# Patient Record
Sex: FEMALE | Race: WHITE
[De-identification: names, ages, dates, MRNs, and addresses within clinical notes are randomized per-mention and may not be internally consistent; named-entity substitution may affect disease eponyms.]

---

## 2017-07-10 ENCOUNTER — APPOINTMENT (OUTPATIENT)
Dept: ORTHOPEDIC SURGERY | Facility: CLINIC | Age: 42
End: 2017-07-10

## 2017-07-10 VITALS
SYSTOLIC BLOOD PRESSURE: 98 MMHG | BODY MASS INDEX: 20.66 KG/M2 | HEIGHT: 64 IN | HEART RATE: 60 BPM | DIASTOLIC BLOOD PRESSURE: 67 MMHG | WEIGHT: 121 LBS

## 2017-07-10 DIAGNOSIS — Z87.39 PERSONAL HISTORY OF OTHER DISEASES OF THE MUSCULOSKELETAL SYSTEM AND CONNECTIVE TISSUE: ICD-10-CM

## 2017-07-10 DIAGNOSIS — M54.2 CERVICALGIA: ICD-10-CM

## 2018-02-20 ENCOUNTER — RESULT REVIEW (OUTPATIENT)
Age: 43
End: 2018-02-20

## 2018-03-05 ENCOUNTER — APPOINTMENT (OUTPATIENT)
Dept: MRI IMAGING | Facility: CLINIC | Age: 43
End: 2018-03-05

## 2018-03-05 ENCOUNTER — OUTPATIENT (OUTPATIENT)
Dept: OUTPATIENT SERVICES | Facility: HOSPITAL | Age: 43
LOS: 1 days | End: 2018-03-05
Payer: COMMERCIAL

## 2018-03-05 DIAGNOSIS — Z00.8 ENCOUNTER FOR OTHER GENERAL EXAMINATION: ICD-10-CM

## 2018-03-05 DIAGNOSIS — Z90.710 ACQUIRED ABSENCE OF BOTH CERVIX AND UTERUS: Chronic | ICD-10-CM

## 2018-03-05 DIAGNOSIS — Z96.60 PRESENCE OF UNSPECIFIED ORTHOPEDIC JOINT IMPLANT: Chronic | ICD-10-CM

## 2018-03-05 DIAGNOSIS — Z98.89 OTHER SPECIFIED POSTPROCEDURAL STATES: Chronic | ICD-10-CM

## 2018-03-05 PROCEDURE — 73721 MRI JNT OF LWR EXTRE W/O DYE: CPT

## 2018-03-05 PROCEDURE — 73721 MRI JNT OF LWR EXTRE W/O DYE: CPT | Mod: 26,LT

## 2018-08-20 ENCOUNTER — INPATIENT (INPATIENT)
Facility: HOSPITAL | Age: 43
LOS: 2 days | Discharge: HOME | DRG: 871 | End: 2018-08-23
Attending: HOSPITALIST | Admitting: HOSPITALIST
Payer: COMMERCIAL

## 2018-08-20 VITALS
OXYGEN SATURATION: 98 % | HEIGHT: 64 IN | SYSTOLIC BLOOD PRESSURE: 116 MMHG | WEIGHT: 113.98 LBS | DIASTOLIC BLOOD PRESSURE: 81 MMHG | TEMPERATURE: 98 F | HEART RATE: 83 BPM | RESPIRATION RATE: 20 BRPM

## 2018-08-20 DIAGNOSIS — Z96.60 PRESENCE OF UNSPECIFIED ORTHOPEDIC JOINT IMPLANT: Chronic | ICD-10-CM

## 2018-08-20 DIAGNOSIS — Z90.710 ACQUIRED ABSENCE OF BOTH CERVIX AND UTERUS: Chronic | ICD-10-CM

## 2018-08-20 DIAGNOSIS — Z98.89 OTHER SPECIFIED POSTPROCEDURAL STATES: Chronic | ICD-10-CM

## 2018-08-20 LAB
ALBUMIN SERPL ELPH-MCNC: 4.2 G/DL — SIGNIFICANT CHANGE UP (ref 3.3–5)
ALP SERPL-CCNC: 99 U/L — SIGNIFICANT CHANGE UP (ref 40–120)
ALT FLD-CCNC: 17 U/L — SIGNIFICANT CHANGE UP (ref 10–45)
ANION GAP SERPL CALC-SCNC: 11 MMOL/L — SIGNIFICANT CHANGE UP (ref 5–17)
AST SERPL-CCNC: 21 U/L — SIGNIFICANT CHANGE UP (ref 10–40)
BASOPHILS # BLD AUTO: 0.1 K/UL — SIGNIFICANT CHANGE UP (ref 0–0.2)
BASOPHILS NFR BLD AUTO: 0.6 % — SIGNIFICANT CHANGE UP (ref 0–2)
BILIRUB SERPL-MCNC: 0.3 MG/DL — SIGNIFICANT CHANGE UP (ref 0.2–1.2)
BUN SERPL-MCNC: 8 MG/DL — SIGNIFICANT CHANGE UP (ref 7–23)
CALCIUM SERPL-MCNC: 9.2 MG/DL — SIGNIFICANT CHANGE UP (ref 8.4–10.5)
CHLORIDE SERPL-SCNC: 104 MMOL/L — SIGNIFICANT CHANGE UP (ref 96–108)
CO2 SERPL-SCNC: 26 MMOL/L — SIGNIFICANT CHANGE UP (ref 22–31)
CREAT SERPL-MCNC: 0.69 MG/DL — SIGNIFICANT CHANGE UP (ref 0.5–1.3)
EOSINOPHIL # BLD AUTO: 0.1 K/UL — SIGNIFICANT CHANGE UP (ref 0–0.5)
EOSINOPHIL NFR BLD AUTO: 0.4 % — SIGNIFICANT CHANGE UP (ref 0–6)
GLUCOSE SERPL-MCNC: 93 MG/DL — SIGNIFICANT CHANGE UP (ref 70–99)
HCT VFR BLD CALC: 43.4 % — SIGNIFICANT CHANGE UP (ref 34.5–45)
HGB BLD-MCNC: 14.7 G/DL — SIGNIFICANT CHANGE UP (ref 11.5–15.5)
LYMPHOCYTES # BLD AUTO: 14.4 % — SIGNIFICANT CHANGE UP (ref 13–44)
LYMPHOCYTES # BLD AUTO: 2.2 K/UL — SIGNIFICANT CHANGE UP (ref 1–3.3)
MCHC RBC-ENTMCNC: 32.7 PG — SIGNIFICANT CHANGE UP (ref 27–34)
MCHC RBC-ENTMCNC: 33.9 GM/DL — SIGNIFICANT CHANGE UP (ref 32–36)
MCV RBC AUTO: 96.5 FL — SIGNIFICANT CHANGE UP (ref 80–100)
MONOCYTES # BLD AUTO: 0.9 K/UL — SIGNIFICANT CHANGE UP (ref 0–0.9)
MONOCYTES NFR BLD AUTO: 6.3 % — SIGNIFICANT CHANGE UP (ref 2–14)
NEUTROPHILS # BLD AUTO: 11.6 K/UL — HIGH (ref 1.8–7.4)
NEUTROPHILS NFR BLD AUTO: 78.3 % — HIGH (ref 43–77)
PLATELET # BLD AUTO: 266 K/UL — SIGNIFICANT CHANGE UP (ref 150–400)
POTASSIUM SERPL-MCNC: 4.5 MMOL/L — SIGNIFICANT CHANGE UP (ref 3.5–5.3)
POTASSIUM SERPL-SCNC: 4.5 MMOL/L — SIGNIFICANT CHANGE UP (ref 3.5–5.3)
PROT SERPL-MCNC: 7.2 G/DL — SIGNIFICANT CHANGE UP (ref 6–8.3)
RAPID RVP RESULT: SIGNIFICANT CHANGE UP
RBC # BLD: 4.5 M/UL — SIGNIFICANT CHANGE UP (ref 3.8–5.2)
RBC # FLD: 11.5 % — SIGNIFICANT CHANGE UP (ref 10.3–14.5)
SODIUM SERPL-SCNC: 141 MMOL/L — SIGNIFICANT CHANGE UP (ref 135–145)
WBC # BLD: 14.9 K/UL — HIGH (ref 3.8–10.5)
WBC # FLD AUTO: 14.9 K/UL — HIGH (ref 3.8–10.5)

## 2018-08-20 PROCEDURE — 71046 X-RAY EXAM CHEST 2 VIEWS: CPT | Mod: 26

## 2018-08-20 PROCEDURE — 99285 EMERGENCY DEPT VISIT HI MDM: CPT

## 2018-08-20 RX ORDER — SODIUM CHLORIDE 9 MG/ML
1000 INJECTION INTRAMUSCULAR; INTRAVENOUS; SUBCUTANEOUS ONCE
Qty: 0 | Refills: 0 | Status: COMPLETED | OUTPATIENT
Start: 2018-08-20 | End: 2018-08-20

## 2018-08-20 RX ORDER — IPRATROPIUM/ALBUTEROL SULFATE 18-103MCG
3 AEROSOL WITH ADAPTER (GRAM) INHALATION ONCE
Qty: 0 | Refills: 0 | Status: COMPLETED | OUTPATIENT
Start: 2018-08-20 | End: 2018-08-20

## 2018-08-20 RX ORDER — IPRATROPIUM/ALBUTEROL SULFATE 18-103MCG
3 AEROSOL WITH ADAPTER (GRAM) INHALATION
Qty: 0 | Refills: 0 | Status: COMPLETED | OUTPATIENT
Start: 2018-08-20 | End: 2018-08-20

## 2018-08-20 RX ADMIN — Medication 3 MILLILITER(S): at 23:16

## 2018-08-20 RX ADMIN — Medication 125 MILLIGRAM(S): at 21:30

## 2018-08-20 RX ADMIN — Medication 3 MILLILITER(S): at 21:24

## 2018-08-20 RX ADMIN — Medication 3 MILLILITER(S): at 21:51

## 2018-08-20 RX ADMIN — Medication 3 MILLILITER(S): at 21:31

## 2018-08-20 RX ADMIN — SODIUM CHLORIDE 1000 MILLILITER(S): 9 INJECTION INTRAMUSCULAR; INTRAVENOUS; SUBCUTANEOUS at 23:16

## 2018-08-20 NOTE — ED PROVIDER NOTE - MEDICAL DECISION MAKING DETAILS
44 y/o F pt with no significant PMHx c/o fever, myalgia, SOB, productive cough. Plan: IV steroids, nebs, labs, RVP, reassess

## 2018-08-20 NOTE — ED ADULT NURSE REASSESSMENT NOTE - NS ED NURSE REASSESS COMMENT FT1
Patient still had diffuse wheezing and course lungs sounds bilaterally after 3 duonebs; states she is feeling a little bit better. Patient to have chest xray.

## 2018-08-20 NOTE — ED ADULT NURSE NOTE - OBJECTIVE STATEMENT
43 y.o F presents to the ED from home c/o bronchitis. Patient is awake, alert and speaking coherently. As per patient she was diagnosed by her PCP 2 weeks ago with bronchitis; states she was prescribed Levaquin and a medrol pack. Patient reports having gotten back from [place] about 5 days ago and noticed symptoms getting worse. Patient reports having a fever of 101.0 last night and states she had a productive cough with green sputum. Patient is a daily smoker. Patient presents A&Ox3, afebrile and ambulatory; endorses headache, 3/10 but took Tylenol at 1800 prior to ED arrival; denies chest pain and SOB, wheezesw diffusely on ascultation. 43 y.o F presents to the ED from home c/o bronchitis. Patient is awake, alert and speaking coherently. As per patient she was diagnosed by her PCP 2 weeks ago with bronchitis; states she was prescribed Levaquin and a medrol pack. Patient reports having gotten back from [place] about 5 days ago and noticed symptoms getting worse. Patient reports having a fever of 101.0 last night and states she had a productive cough with green sputum accompanied by some SOB. Patient is a daily smoker. Patient presents A&Ox3, afebrile and ambulatory; endorses headache, 3/10 but took Tylenol at 1800 prior to ED arrival; denies chest pain but slightly SOB, 20 RR and spo2 98% on RA- continues to have productive cough. Patient had pneumonia 4x last year.

## 2018-08-20 NOTE — ED PROVIDER NOTE - OBJECTIVE STATEMENT
44 y/o F pt with no significant PMHx c/o worsening congestion, fever, headache and general body aches. Was recently Dx'd with bronchitis by PMD 2 weeks ago and completed XR which was normal. Was Rx'd Levaquin and steroidsx1 week. States that sx improved but returned after a few days. Now notes worsening cough, congestion and fever yesterday (Tm 101F). Notes SOB and nausea. Pt says she feels like "she has the flu". Pt went to see PMD today who referred pt to the ED because pt has a hx of 4 pneumonias in the past. Took Motrin PTA. Denies abd pain, dysuria, diarrhea or any other complaints.  Internal Medicine: Dr. Toñito Maradiaga

## 2018-08-21 DIAGNOSIS — A41.9 SEPSIS, UNSPECIFIED ORGANISM: ICD-10-CM

## 2018-08-21 DIAGNOSIS — R51 HEADACHE: ICD-10-CM

## 2018-08-21 DIAGNOSIS — J18.1 LOBAR PNEUMONIA, UNSPECIFIED ORGANISM: ICD-10-CM

## 2018-08-21 DIAGNOSIS — N64.89 OTHER SPECIFIED DISORDERS OF BREAST: ICD-10-CM

## 2018-08-21 DIAGNOSIS — J18.9 PNEUMONIA, UNSPECIFIED ORGANISM: ICD-10-CM

## 2018-08-21 LAB
APTT BLD: 33 SEC — SIGNIFICANT CHANGE UP (ref 27.5–37.4)
GRAM STN FLD: SIGNIFICANT CHANGE UP
HCT VFR BLD CALC: 42.1 % — SIGNIFICANT CHANGE UP (ref 34.5–45)
HGB BLD-MCNC: 13.2 G/DL — SIGNIFICANT CHANGE UP (ref 11.5–15.5)
INR BLD: 1.09 RATIO — SIGNIFICANT CHANGE UP (ref 0.88–1.16)
MCHC RBC-ENTMCNC: 30.3 PG — SIGNIFICANT CHANGE UP (ref 27–34)
MCHC RBC-ENTMCNC: 31.3 GM/DL — LOW (ref 32–36)
MCV RBC AUTO: 96.7 FL — SIGNIFICANT CHANGE UP (ref 80–100)
PLATELET # BLD AUTO: 249 K/UL — SIGNIFICANT CHANGE UP (ref 150–400)
PROTHROM AB SERPL-ACNC: 11.9 SEC — SIGNIFICANT CHANGE UP (ref 9.8–12.7)
RBC # BLD: 4.35 M/UL — SIGNIFICANT CHANGE UP (ref 3.8–5.2)
RBC # FLD: 11.4 % — SIGNIFICANT CHANGE UP (ref 10.3–14.5)
SPECIMEN SOURCE: SIGNIFICANT CHANGE UP
WBC # BLD: 8.3 K/UL — SIGNIFICANT CHANGE UP (ref 3.8–10.5)
WBC # FLD AUTO: 8.3 K/UL — SIGNIFICANT CHANGE UP (ref 3.8–10.5)

## 2018-08-21 PROCEDURE — 12345: CPT | Mod: NC

## 2018-08-21 PROCEDURE — 70450 CT HEAD/BRAIN W/O DYE: CPT | Mod: 26

## 2018-08-21 PROCEDURE — 99223 1ST HOSP IP/OBS HIGH 75: CPT

## 2018-08-21 PROCEDURE — 71275 CT ANGIOGRAPHY CHEST: CPT | Mod: 26

## 2018-08-21 RX ORDER — CEFTRIAXONE 500 MG/1
1 INJECTION, POWDER, FOR SOLUTION INTRAMUSCULAR; INTRAVENOUS EVERY 24 HOURS
Qty: 0 | Refills: 0 | Status: DISCONTINUED | OUTPATIENT
Start: 2018-08-21 | End: 2018-08-23

## 2018-08-21 RX ORDER — SENNA PLUS 8.6 MG/1
2 TABLET ORAL AT BEDTIME
Qty: 0 | Refills: 0 | Status: DISCONTINUED | OUTPATIENT
Start: 2018-08-21 | End: 2018-08-23

## 2018-08-21 RX ORDER — CEFTRIAXONE 500 MG/1
1 INJECTION, POWDER, FOR SOLUTION INTRAMUSCULAR; INTRAVENOUS ONCE
Qty: 0 | Refills: 0 | Status: COMPLETED | OUTPATIENT
Start: 2018-08-21 | End: 2018-08-21

## 2018-08-21 RX ORDER — DOCUSATE SODIUM 100 MG
100 CAPSULE ORAL THREE TIMES A DAY
Qty: 0 | Refills: 0 | Status: DISCONTINUED | OUTPATIENT
Start: 2018-08-21 | End: 2018-08-23

## 2018-08-21 RX ORDER — AZITHROMYCIN 500 MG/1
500 TABLET, FILM COATED ORAL ONCE
Qty: 0 | Refills: 0 | Status: COMPLETED | OUTPATIENT
Start: 2018-08-21 | End: 2018-08-21

## 2018-08-21 RX ORDER — LANOLIN ALCOHOL/MO/W.PET/CERES
3 CREAM (GRAM) TOPICAL AT BEDTIME
Qty: 0 | Refills: 0 | Status: COMPLETED | OUTPATIENT
Start: 2018-08-21 | End: 2018-08-22

## 2018-08-21 RX ORDER — AZITHROMYCIN 500 MG/1
500 TABLET, FILM COATED ORAL EVERY 24 HOURS
Qty: 0 | Refills: 0 | Status: DISCONTINUED | OUTPATIENT
Start: 2018-08-21 | End: 2018-08-23

## 2018-08-21 RX ORDER — SODIUM CHLORIDE 9 MG/ML
1000 INJECTION, SOLUTION INTRAVENOUS
Qty: 0 | Refills: 0 | Status: DISCONTINUED | OUTPATIENT
Start: 2018-08-21 | End: 2018-08-22

## 2018-08-21 RX ORDER — ACETAMINOPHEN 500 MG
650 TABLET ORAL EVERY 6 HOURS
Qty: 0 | Refills: 0 | Status: DISCONTINUED | OUTPATIENT
Start: 2018-08-21 | End: 2018-08-23

## 2018-08-21 RX ORDER — IPRATROPIUM/ALBUTEROL SULFATE 18-103MCG
3 AEROSOL WITH ADAPTER (GRAM) INHALATION EVERY 6 HOURS
Qty: 0 | Refills: 0 | Status: DISCONTINUED | OUTPATIENT
Start: 2018-08-21 | End: 2018-08-23

## 2018-08-21 RX ADMIN — Medication 375 MILLIGRAM(S): at 11:21

## 2018-08-21 RX ADMIN — Medication 100 MILLIGRAM(S): at 11:22

## 2018-08-21 RX ADMIN — Medication 3 MILLILITER(S): at 17:56

## 2018-08-21 RX ADMIN — CEFTRIAXONE 100 GRAM(S): 500 INJECTION, POWDER, FOR SOLUTION INTRAMUSCULAR; INTRAVENOUS at 01:40

## 2018-08-21 RX ADMIN — Medication 375 MILLIGRAM(S): at 12:53

## 2018-08-21 RX ADMIN — SODIUM CHLORIDE 150 MILLILITER(S): 9 INJECTION, SOLUTION INTRAVENOUS at 07:40

## 2018-08-21 RX ADMIN — Medication 3 MILLILITER(S): at 23:37

## 2018-08-21 RX ADMIN — AZITHROMYCIN 250 MILLIGRAM(S): 500 TABLET, FILM COATED ORAL at 02:33

## 2018-08-21 RX ADMIN — Medication 3 MILLILITER(S): at 11:21

## 2018-08-21 NOTE — H&P ADULT - HISTORY OF PRESENT ILLNESS
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    Pt states she was initially treated with azithromycin about 2 weeks ago for a few days, but then switched to levaquin and medrol dose pack, of which she took 1 week of, last dose was 8/11. Pt reports feeling better for a few days, but starting around 8/16, pt reports increasing fevers, cough, congestion, SOB, nausea. Her symptoms continued to get worse. In addition, she also started to have some headache and neck pain. Pt saw her PMD who referred her to the hospital. Pt is not sure whether her neck pain/stiffness is related to her cervical fusion. Pt also reports mild photophobia as well. Pt states her baseline SBP is in the 80s and 90s. Denies any rashes.    VS: Tm 98.7, P 58, BP 96/57, R 19, 96% RA  in the ED, received azithro, ceftriaxone, NS1L, duoneb x2, solumedrol 125.

## 2018-08-21 NOTE — H&P ADULT - ASSESSMENT
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

## 2018-08-21 NOTE — PROGRESS NOTE ADULT - ASSESSMENT
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 Headache mostly resolved, CTH reassuring as well.

## 2018-08-21 NOTE — PROGRESS NOTE ADULT - SUBJECTIVE AND OBJECTIVE BOX
CC: SOB    Overnight w/ out aucte concerns.  This AM still SOB w/ cough.    REVIEW OF SYSTEMS:  CONSTITUTIONAL: +Fevers, fatigue.  EYES: No eye pain, visual disturbances, or discharge  ENMT:  No difficulty hearing, tinnitus, vertigo; No sinus or throat pain  NECK: No pain or stiffness  RESPIRATORY: +cough, sOB, wheezing.  CARDIOVASCULAR: No chest pain, palpitations, dizziness, or leg swelling  GASTROINTESTINAL: No abdominal or epigastric pain. No nausea, vomiting, or hematemesis; No diarrhea or constipation. No melena or hematochezia.  GENITOURINARY: No dysuria, frequency, hematuria, or incontinence  NEUROLOGICAL: No headaches, memory loss, loss of strength, numbness, or tremors  SKIN: No itching, burning, rashes, or lesions   ENDOCRINE: No heat or cold intolerance; No hair loss  MUSCULOSKELETAL: No joint pain or swelling; No muscle, back, or extremity pain  PSYCHIATRIC: No depression, anxiety, mood swings, or difficulty sleeping  HEME/LYMPH: No easy bruising, or bleeding gums    T(C): 36.8 (08-21-18 @ 12:26), Max: 37.1 (08-21-18 @ 01:37)  HR: 62 (08-21-18 @ 12:26) (56 - 83)  BP: 108/73 (08-21-18 @ 12:26) (96/57 - 116/81)  RR: 18 (08-21-18 @ 12:26) (16 - 20)  SpO2: 98% (08-21-18 @ 12:26) (96% - 100%)  Wt(kg): --Vital Signs Last 24 Hrs  T(C): 36.8 (21 Aug 2018 12:26), Max: 37.1 (21 Aug 2018 01:37)  T(F): 98.2 (21 Aug 2018 12:26), Max: 98.7 (21 Aug 2018 01:37)  HR: 62 (21 Aug 2018 12:26) (56 - 83)  BP: 108/73 (21 Aug 2018 12:26) (96/57 - 116/81)  BP(mean): --  RR: 18 (21 Aug 2018 12:26) (16 - 20)  SpO2: 98% (21 Aug 2018 12:26) (96% - 100%)    PHYSICAL EXAM:  GENERAL: NAD, well-groomed. In good spirits, mother by bedside.   HEAD:  Atraumatic, Normocephalic  EYES: EOMI, conjunctiva and sclera clear  NECK: Supple, No JVD  NERVOUS SYSTEM:  Alert & Oriented X3, Good concentration; Motor Strength 5/5 B/L upper and lower extremities  CHEST/LUNG: b/l crackles, wheezing.  HEART: Regular rate and rhythm; No murmurs  ABDOMEN: Soft, Nontender, Nondistended; Bowel sounds present  EXTREMITIES:  2+ Peripheral Pulses, No clubbing, cyanosis, or edema  SKIN: No rashes or lesions    Consultant(s) Notes Reviewed:  [x ] YES  [ ] NO    LABS:  leukocytosis resolved, BMP WNL.                         13.2   8.3   )-----------( 249      ( 21 Aug 2018 07:51 )             42.1     08-20    141  |  104  |  8   ----------------------------<  93  4.5   |  26  |  0.69    Ca    9.2      20 Aug 2018 21:26    TPro  7.2  /  Alb  4.2  /  TBili  0.3  /  DBili  x   /  AST  21  /  ALT  17  /  AlkPhos  99  08-20    PT/INR - ( 21 Aug 2018 07:51 )   PT: 11.9 sec;   INR: 1.09 ratio      PTT - ( 21 Aug 2018 07:51 )  PTT:33.0 sec    CAPILLARY BLOOD GLUCOSE    RADIOLOGY & ADDITIONAL TESTS:  Imaging Personally Reviewed:   Care discussed w/ other providers: yes--  Consultant notes reviewed: yes    CTH WNL  CT chest w/ ?RLL PNA. CC: SOB    Overnight w/ out aucte concerns.  This AM still SOB w/ cough.    REVIEW OF SYSTEMS:  CONSTITUTIONAL: +Fevers, fatigue.  EYES: No eye pain, visual disturbances, or discharge  ENMT:  No difficulty hearing, tinnitus, vertigo; No sinus or throat pain  NECK: No pain or stiffness  RESPIRATORY: +cough, sOB, wheezing.  CARDIOVASCULAR: No chest pain, palpitations, dizziness, or leg swelling  GASTROINTESTINAL: No abdominal or epigastric pain. No nausea, vomiting, or hematemesis; No diarrhea or constipation. No melena or hematochezia.  GENITOURINARY: No dysuria, frequency, hematuria, or incontinence  NEUROLOGICAL: No headaches, memory loss, loss of strength, numbness, or tremors  SKIN: No itching, burning, rashes, or lesions   ENDOCRINE: No heat or cold intolerance; No hair loss  MUSCULOSKELETAL: No joint pain or swelling; No muscle, back, or extremity pain  PSYCHIATRIC: No depression, anxiety, mood swings, or difficulty sleeping  HEME/LYMPH: No easy bruising, or bleeding gums    T(C): 36.8 (08-21-18 @ 12:26), Max: 37.1 (08-21-18 @ 01:37)  HR: 62 (08-21-18 @ 12:26) (56 - 83)  BP: 108/73 (08-21-18 @ 12:26) (96/57 - 116/81)  RR: 18 (08-21-18 @ 12:26) (16 - 20)  SpO2: 98% (08-21-18 @ 12:26) (96% - 100%)  Wt(kg): --Vital Signs Last 24 Hrs  T(C): 36.8 (21 Aug 2018 12:26), Max: 37.1 (21 Aug 2018 01:37)  T(F): 98.2 (21 Aug 2018 12:26), Max: 98.7 (21 Aug 2018 01:37)  HR: 62 (21 Aug 2018 12:26) (56 - 83)  BP: 108/73 (21 Aug 2018 12:26) (96/57 - 116/81)  BP(mean): --  RR: 18 (21 Aug 2018 12:26) (16 - 20)  SpO2: 98% (21 Aug 2018 12:26) (96% - 100%)    PHYSICAL EXAM:  GENERAL: NAD, well-groomed. In good spirits, mother by bedside.   HEAD:  Atraumatic, Normocephalic  EYES: EOMI, conjunctiva and sclera clear  NECK: Supple, No JVD. Easily moving L and R, up and down.  NERVOUS SYSTEM:  Alert & Oriented X3, Good concentration; Motor Strength 5/5 B/L upper and lower extremities  CHEST/LUNG: b/l crackles, wheezing.  HEART: Regular rate and rhythm; No murmurs  ABDOMEN: Soft, Nontender, Nondistended; Bowel sounds present  EXTREMITIES:  2+ Peripheral Pulses, No clubbing, cyanosis, or edema  SKIN: No rashes or lesions    Consultant(s) Notes Reviewed:  [x ] YES  [ ] NO    LABS:  leukocytosis resolved, BMP WNL.                         13.2   8.3   )-----------( 249      ( 21 Aug 2018 07:51 )             42.1     08-20    141  |  104  |  8   ----------------------------<  93  4.5   |  26  |  0.69    Ca    9.2      20 Aug 2018 21:26    TPro  7.2  /  Alb  4.2  /  TBili  0.3  /  DBili  x   /  AST  21  /  ALT  17  /  AlkPhos  99  08-20    PT/INR - ( 21 Aug 2018 07:51 )   PT: 11.9 sec;   INR: 1.09 ratio      PTT - ( 21 Aug 2018 07:51 )  PTT:33.0 sec    CAPILLARY BLOOD GLUCOSE    RADIOLOGY & ADDITIONAL TESTS:  Imaging Personally Reviewed:   Care discussed w/ other providers: yes--  Consultant notes reviewed: yes    CTH WNL  CT chest w/ ?RLL PNA.

## 2018-08-21 NOTE — H&P ADULT - PROBLEM SELECTOR PLAN 1
- s/p CTA chest  - cont azithro/ceftriaxone for now  - cont prednisone 40qd for taper  - cont duonebs

## 2018-08-21 NOTE — PROGRESS NOTE ADULT - PROBLEM SELECTOR PLAN 1
- s/p CTA chest  - cont azithro/ceftriaxone for now  - cont prednisone 40qd for taper  - cont duonebs - s/p CTA chest  - cont azithro/ceftriaxone for now  - f/u Geoff, sputum culture  - cont prednisone 40qd for taper  - cont duonebs - s/p CTA chest w/ suggestion of RLL PNA  - also clinically w/ COPD exacerbation  - cont azithro/ceftriaxone for now  - f/u Cassiuseghughlla, sputum culture  - cont pred 40, improving as of today though reports feeling "tight" still. If not continuing to improve would consider switching to short course of IV steroids.  - try to minimize nsaid while taking prednisone.  - cont duonebs - s/p CTA chest w/ suggestion of RLL PNA  - clinically w/ COPD exacerbation  - cont azithro/ceftriaxone for now  - f/u Cassiuseghughlla, sputum culture  - cont pred 40, improving as of today though reports feeling "tight" still. If not continuing to improve would consider switching to short course of IV steroids.  - try to minimize nsaid while taking prednisone.  - cont duonebs

## 2018-08-21 NOTE — H&P ADULT - PROBLEM SELECTOR PLAN 3
- pt currently appears nontoxic, but is complaining of initially severe headache that has now improved but still persistent. Also with associated neck pain in setting of spinal fusion and baseline limited ROM of neck. And also mild photophobia. Brudzinski sign negative.  - will try trial of tylenol and naprosyn  - if clinically on exam, by vitals, or by headache is worsening, would need IR guided LP.  - cont to monitor - pt currently appears nontoxic, but is complaining of initially severe headache that has now improved but still persistent. Also with associated neck pain in setting of spinal fusion and baseline limited ROM of neck. And also mild photophobia. Brudzinski sign negative.  - will try trial of tylenol and naprosyn.  - will get CTH  - if clinically on exam, by vitals, or by headache is worsening, would need IR guided LP.  - cont to monitor

## 2018-08-21 NOTE — H&P ADULT - NSHPREVIEWOFSYSTEMS_GEN_ALL_CORE
REVIEW OF SYSTEMS:  CONSTITUTIONAL: +weakness. +fevers. +chills. No rigors. No weight loss. No poor appetite.  EYES: +mild visual changes. No eye pain.  ENT: No hearing difficulty. No vertigo. No dysphagia. No Sinusitis/rhinorrhea.  NECK: +pain. +mild stiffness/rigidity.  CARDIAC: No chest pain. No palpitations.  RESPIRATORY: +cough. +SOB. No hemoptysis.  GASTROINTESTINAL: No abdominal pain. +nausea. No vomiting. No hematemesis. No diarrhea. No constipation. No melena. No hematochezia.  GENITOURINARY: No dysuria. No frequency. No hesitancy. No hematuria.  NEUROLOGICAL: No numbness/tingling. No focal weakness. No incontinence. No headache.  BACK: +chronic back pain.  EXTREMITIES: No lower extremity edema. Full ROM.  SKIN: No rashes. No itching. No other lesions.  PSYCHIATRIC: No depression. No anxiety. No SI/HI.  ALLERGIC: No lip swelling. No hives.  All other review of systems is negative unless indicated above.  Unless indicated above, unable to assess ROS 2/2

## 2018-08-21 NOTE — H&P ADULT - PROBLEM SELECTOR PLAN 2
- likely 2/2 CAP, less likely meningitis  - RVP negative  - send sputum culture  - send blood culture

## 2018-08-21 NOTE — H&P ADULT - NSHPLABSRESULTS_GEN_ALL_CORE
Personally reviewed labs.   Personally reviewed imaging.                           14.7   14.9  )-----------( 266      ( 20 Aug 2018 21:26 )             43.4       08-20    141  |  104  |  8   ----------------------------<  93  4.5   |  26  |  0.69    Ca    9.2      20 Aug 2018 21:26    TPro  7.2  /  Alb  4.2  /  TBili  0.3  /  DBili  x   /  AST  21  /  ALT  17  /  AlkPhos  99  08-20            LIVER FUNCTIONS - ( 20 Aug 2018 21:26 )  Alb: 4.2 g/dL / Pro: 7.2 g/dL / ALK PHOS: 99 U/L / ALT: 17 U/L / AST: 21 U/L / GGT: x

## 2018-08-21 NOTE — ED ADULT NURSE REASSESSMENT NOTE - NS ED NURSE REASSESS COMMENT FT1
Patient reassessed; no change in lung sounds, wheezes remain. BP 96/57 but patient reports her BP is frequently low. 20 g IV in R AC patent and site remains WNL. Patient admitted and waiting for bed assignment; resting comfortably.

## 2018-08-21 NOTE — H&P ADULT - PROBLEM SELECTOR PLAN 4
- Pt provided copy of imaging results regarding asymmetric left breast density.   - Pt reports last mammogram was a few months ago that showed right breast abnormality  - pt to followup with obgyn as outpt for further evaluation

## 2018-08-21 NOTE — PROGRESS NOTE ADULT - PROBLEM SELECTOR PLAN 2
- likely 2/2 CAP, less likely meningitis  - RVP negative  - send sputum culture  - send blood culture - resolved; no more leukocytosis, tachycardia  - f/u sputum, blood culture.   - ?RLL PNA, will cont w/ abx for now.

## 2018-08-21 NOTE — PROGRESS NOTE ADULT - PROBLEM SELECTOR PLAN 3
- pt currently appears nontoxic, but is complaining of initially severe headache that has now improved but still persistent. Also with associated neck pain in setting of spinal fusion and baseline limited ROM of neck. And also mild photophobia. Brudzinski sign negative.  - will try trial of tylenol and naprosyn.  - will get CTH  - if clinically on exam, by vitals, or by headache is worsening, would need IR guided LP.  - cont to monitor - resolved - resolved.  - neck fully mobile.  - likely from dehydration, cont ot monitor.  - try to avoid nsaids while on steroids if possible.

## 2018-08-21 NOTE — H&P ADULT - NSHPSOCIALHISTORY_GEN_ALL_CORE
Social History:    Marital Status: ( x ) , (  ) Single, (  ) , (  ) , (  )   # of Children: 3  Lives with: (  ) alone, ( x ) children, (  ) spouse, (  ) parents, (  ) siblings, (  ) friends, (  ) other:   Occupation:     Substance Use/Illicit Drugs: (  ) never used vs other:   Tobacco Usage: (  ) never smoked, ( x ) former smoker, (  ) current smoker and Total Pack-Years: 15  Last Alcohol Usage/Frequency/Amount/Withdrawal/Hx of Abuse:  last drink 4 days ago  Foreign travel/Animal exposure:

## 2018-08-21 NOTE — H&P ADULT - NSHPPHYSICALEXAM_GEN_ALL_CORE
PHYSICAL EXAM:   GENERAL: Alert. Not confused. No acute distress. +thin.   HEAD:  Atraumatic. Normocephalic.  EYES: EOMI. PERRLA. Normal conjunctiva/sclera.  ENT: Neck supple. No JVD. Moist oral mucosa. Not edentulous. No thrush.  LYMPH: Normal supraclavicular/cervical lymph nodes.   CARDIAC: Not tachy, Not eric. Regular rhythm. Not irregularly irregular. S1. S2. No murmur. No rub. No distant heart sounds.  LUNG/CHEST: +rhonchi b/l. +inspiratory and expiratory wheezes.  ABDOMEN: Soft. No tenderness. No distension. No fluid wave. Normal bowel sounds.  BACK: No midline/vertebral tenderness. No flank tenderness.  VASCULAR: +2 b/l radial or ulnar pulses. Palpable DP pulses.  EXTREMITIES:  No clubbing. No cyanosis. No edema. Moving all 4.  NEUROLOGY: A&Ox3. Non-focal exam. Cranial nerves intact. Slight lisp. Sensation intact.  PSYCH: Normal behavior. Normal affect.  SKIN: No jaundice. No erythema. No rash/lesion.  Vascular Access:     ICU Vital Signs Last 24 Hrs  T(C): 37 (21 Aug 2018 05:59), Max: 37.1 (21 Aug 2018 01:37)  T(F): 98.6 (21 Aug 2018 05:59), Max: 98.7 (21 Aug 2018 01:37)  HR: 58 (21 Aug 2018 05:59) (58 - 83)  BP: 96/57 (21 Aug 2018 05:59) (96/57 - 116/81)  BP(mean): --  ABP: --  ABP(mean): --  RR: 16 (21 Aug 2018 05:59) (16 - 20)  SpO2: 96% (21 Aug 2018 05:59) (96% - 100%)      I&O's Summary

## 2018-08-22 ENCOUNTER — TRANSCRIPTION ENCOUNTER (OUTPATIENT)
Age: 43
End: 2018-08-22

## 2018-08-22 DIAGNOSIS — Z29.9 ENCOUNTER FOR PROPHYLACTIC MEASURES, UNSPECIFIED: ICD-10-CM

## 2018-08-22 DIAGNOSIS — F17.210 NICOTINE DEPENDENCE, CIGARETTES, UNCOMPLICATED: ICD-10-CM

## 2018-08-22 LAB
ALBUMIN SERPL ELPH-MCNC: 3.2 G/DL — LOW (ref 3.3–5)
ALP SERPL-CCNC: 66 U/L — SIGNIFICANT CHANGE UP (ref 40–120)
ALT FLD-CCNC: 15 U/L — SIGNIFICANT CHANGE UP (ref 10–45)
ANION GAP SERPL CALC-SCNC: 9 MMOL/L — SIGNIFICANT CHANGE UP (ref 5–17)
AST SERPL-CCNC: 18 U/L — SIGNIFICANT CHANGE UP (ref 10–40)
BILIRUB SERPL-MCNC: 0.2 MG/DL — SIGNIFICANT CHANGE UP (ref 0.2–1.2)
BUN SERPL-MCNC: 9 MG/DL — SIGNIFICANT CHANGE UP (ref 7–23)
CALCIUM SERPL-MCNC: 8.4 MG/DL — SIGNIFICANT CHANGE UP (ref 8.4–10.5)
CHLORIDE SERPL-SCNC: 107 MMOL/L — SIGNIFICANT CHANGE UP (ref 96–108)
CO2 SERPL-SCNC: 27 MMOL/L — SIGNIFICANT CHANGE UP (ref 22–31)
CREAT SERPL-MCNC: 0.76 MG/DL — SIGNIFICANT CHANGE UP (ref 0.5–1.3)
GAS PNL BLDV: SIGNIFICANT CHANGE UP
GLUCOSE SERPL-MCNC: 88 MG/DL — SIGNIFICANT CHANGE UP (ref 70–99)
HCT VFR BLD CALC: 35.7 % — SIGNIFICANT CHANGE UP (ref 34.5–45)
HGB BLD-MCNC: 11.6 G/DL — SIGNIFICANT CHANGE UP (ref 11.5–15.5)
LACTATE SERPL-SCNC: 2.4 MMOL/L — HIGH (ref 0.7–2)
LACTATE SERPL-SCNC: 3.6 MMOL/L — HIGH (ref 0.7–2)
MCHC RBC-ENTMCNC: 32 PG — SIGNIFICANT CHANGE UP (ref 27–34)
MCHC RBC-ENTMCNC: 32.5 GM/DL — SIGNIFICANT CHANGE UP (ref 32–36)
MCV RBC AUTO: 98.6 FL — SIGNIFICANT CHANGE UP (ref 80–100)
PLATELET # BLD AUTO: 206 K/UL — SIGNIFICANT CHANGE UP (ref 150–400)
POTASSIUM SERPL-MCNC: 4.5 MMOL/L — SIGNIFICANT CHANGE UP (ref 3.5–5.3)
POTASSIUM SERPL-SCNC: 4.5 MMOL/L — SIGNIFICANT CHANGE UP (ref 3.5–5.3)
PROT SERPL-MCNC: 5.5 G/DL — LOW (ref 6–8.3)
RBC # BLD: 3.62 M/UL — LOW (ref 3.8–5.2)
RBC # FLD: 13.1 % — SIGNIFICANT CHANGE UP (ref 10.3–14.5)
SODIUM SERPL-SCNC: 143 MMOL/L — SIGNIFICANT CHANGE UP (ref 135–145)
WBC # BLD: 8.49 K/UL — SIGNIFICANT CHANGE UP (ref 3.8–10.5)
WBC # FLD AUTO: 8.49 K/UL — SIGNIFICANT CHANGE UP (ref 3.8–10.5)

## 2018-08-22 PROCEDURE — 99232 SBSQ HOSP IP/OBS MODERATE 35: CPT

## 2018-08-22 RX ORDER — SODIUM CHLORIDE 9 MG/ML
500 INJECTION INTRAMUSCULAR; INTRAVENOUS; SUBCUTANEOUS ONCE
Qty: 0 | Refills: 0 | Status: COMPLETED | OUTPATIENT
Start: 2018-08-22 | End: 2018-08-22

## 2018-08-22 RX ORDER — BUPROPION HYDROCHLORIDE 150 MG/1
150 TABLET, EXTENDED RELEASE ORAL
Qty: 0 | Refills: 0 | Status: CANCELLED | OUTPATIENT
Start: 2018-08-25 | End: 2018-08-23

## 2018-08-22 RX ORDER — SODIUM CHLORIDE 9 MG/ML
1000 INJECTION, SOLUTION INTRAVENOUS
Qty: 0 | Refills: 0 | Status: DISCONTINUED | OUTPATIENT
Start: 2018-08-22 | End: 2018-08-23

## 2018-08-22 RX ORDER — BUPROPION HYDROCHLORIDE 150 MG/1
150 TABLET, EXTENDED RELEASE ORAL DAILY
Qty: 0 | Refills: 0 | Status: DISCONTINUED | OUTPATIENT
Start: 2018-08-22 | End: 2018-08-23

## 2018-08-22 RX ADMIN — Medication 40 MILLIGRAM(S): at 05:18

## 2018-08-22 RX ADMIN — Medication 3 MILLILITER(S): at 05:18

## 2018-08-22 RX ADMIN — SODIUM CHLORIDE 1000 MILLILITER(S): 9 INJECTION INTRAMUSCULAR; INTRAVENOUS; SUBCUTANEOUS at 14:05

## 2018-08-22 RX ADMIN — Medication 3 MILLILITER(S): at 17:33

## 2018-08-22 RX ADMIN — Medication 600 MILLIGRAM(S): at 13:00

## 2018-08-22 RX ADMIN — BUPROPION HYDROCHLORIDE 150 MILLIGRAM(S): 150 TABLET, EXTENDED RELEASE ORAL at 13:00

## 2018-08-22 RX ADMIN — Medication 600 MILLIGRAM(S): at 21:34

## 2018-08-22 RX ADMIN — CEFTRIAXONE 100 GRAM(S): 500 INJECTION, POWDER, FOR SOLUTION INTRAMUSCULAR; INTRAVENOUS at 01:40

## 2018-08-22 RX ADMIN — Medication 3 MILLILITER(S): at 12:00

## 2018-08-22 RX ADMIN — SODIUM CHLORIDE 1000 MILLILITER(S): 9 INJECTION INTRAMUSCULAR; INTRAVENOUS; SUBCUTANEOUS at 17:33

## 2018-08-22 RX ADMIN — SODIUM CHLORIDE 100 MILLILITER(S): 9 INJECTION, SOLUTION INTRAVENOUS at 07:35

## 2018-08-22 RX ADMIN — Medication 3 MILLIGRAM(S): at 00:56

## 2018-08-22 RX ADMIN — AZITHROMYCIN 250 MILLIGRAM(S): 500 TABLET, FILM COATED ORAL at 02:25

## 2018-08-22 NOTE — PROGRESS NOTE ADULT - PROBLEM SELECTOR PLAN 3
- resolved.  - neck fully mobile.  - likely from dehydration, cont ot monitor.  - try to avoid nsaids while on steroids if possible. -Resolved.  -Possibly was from dehydration, cont to monitor. C/w IVF's.   -Tylenol PRN.

## 2018-08-22 NOTE — DISCHARGE NOTE ADULT - CARE PLAN
Principal Discharge DX:	Sepsis, due to unspecified organism  Goal:	Improved  Assessment and plan of treatment:	Take all antibiotics as ordered.  Call you Health care provider upon arrival home to make a one week follow up appointment.  If you develop fever, chills, malaise, or change in mental status call your Health Care Provider or go to the Emergency Department.  Nutrition is important, eat small frequent meals to help ensure you get adequate calories.  Do not stay in bed all day!  Increase your activity daily as tolerated.  Secondary Diagnosis:	Pneumonia of right lower lobe due to infectious organism  Assessment and plan of treatment:	Pneumonia is a lung infection that can cause a fever, cough, and trouble breathing.  Continue all antibiotics as ordered until complete.  Nutrition is important, eat small frequent meals.  Get lots of rest and drink fluids.  Call your health care provider upon arrival home from hospital and make a follow up appointment for one week.  If your cough worsens, you develop fever greater than 101', you have shaking chills, a fast heartbeat, trouble breathing and/or feel your are breathing much faster than usual, call your healthcare provider.  Make sure you wash your hands frequently.  Secondary Diagnosis:	Acute nonintractable headache, unspecified headache type  Goal:	resolved  Assessment and plan of treatment:	continue with pain regimen.  Secondary Diagnosis:	Taxa-Kdsas-Teyxpvo disease, bilateral  Secondary Diagnosis:	Tobacco dependence due to cigarettes Principal Discharge DX:	Sepsis, due to unspecified organism  Goal:	Improved  Assessment and plan of treatment:	Take all antibiotics as ordered.  Call you Health care provider upon arrival home to make a one week follow up appointment.  If you develop fever, chills, malaise, or change in mental status call your Health Care Provider or go to the Emergency Department.  Nutrition is important, eat small frequent meals to help ensure you get adequate calories.  Do not stay in bed all day!  Increase your activity daily as tolerated.  Secondary Diagnosis:	Pneumonia of right lower lobe due to infectious organism  Assessment and plan of treatment:	Pneumonia is a lung infection that can cause a fever, cough, and trouble breathing.  Continue all antibiotics as ordered until complete.  Nutrition is important, eat small frequent meals.  Get lots of rest and drink fluids.  Call your health care provider upon arrival home from hospital and make a follow up appointment for one week.  If your cough worsens, you develop fever greater than 101', you have shaking chills, a fast heartbeat, trouble breathing and/or feel your are breathing much faster than usual, call your healthcare provider.  Make sure you wash your hands frequently.  Secondary Diagnosis:	Acute nonintractable headache, unspecified headache type  Goal:	resolved  Assessment and plan of treatment:	continue with pain regimen.  Secondary Diagnosis:	Nxtn-Vmcsu-Ubswvls disease, bilateral  Secondary Diagnosis:	Tobacco dependence due to cigarettes  Assessment and plan of treatment:	Continue taking the Wellbutrin to help you quit smoking. Follow up with Dr. Maradiaga.

## 2018-08-22 NOTE — PROGRESS NOTE ADULT - PROBLEM SELECTOR PLAN 1
- s/p CTA chest w/ suggestion of RLL PNA  - clinically w/ COPD exacerbation  - cont azithro/ceftriaxone for now  - f/u Cassiuseghughlla, sputum culture  - cont pred 40, improving as of today though reports feeling "tight" still. If not continuing to improve would consider switching to short course of IV steroids.  - try to minimize nsaid while taking prednisone.  - cont duonebs -S/p CTA chest with RLL PNA.  -Clinically with ?COPD exacerbation, though no known diagnosis of COPD and no obvious signs of COPD on CT angio chest. Though smoking history present.   -C/w azithromycin/ceftriaxone for now. Tentative 5-7 day course of abx.   -Urine legionella testing, sputum culture testing.   -C/w prednisone 40mg PO daily x 5 days.  -C/w Duonebs Q6H standing.   -Will start guaifenesin ER 600mg twice daily for cough.  -Tylenol PRN.

## 2018-08-22 NOTE — DISCHARGE NOTE ADULT - NS AS DC FOLLOWUP STROKE INST
Influenza vaccination ([facility] Pub Date: August 7, 2015)/Smoking Cessation/Pneumonia Pneumonia/Smoking Cessation

## 2018-08-22 NOTE — DISCHARGE NOTE ADULT - HOSPITAL COURSE
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      Problem/Plan - 1:  ·  Problem: CAP (community acquired pneumonia).  Plan: - s/p CTA chest w/ suggestion of RLL PNA  - clinically w/ COPD exacerbation  - cont azithro/ceftriaxone for now  - f/u Ulegionella, sputum culture  - cont pred 40, improving as of today though reports feeling "tight" still. If not continuing to improve would consider switching to short course of IV steroids.  - try to minimize nsaid while taking prednisone.  - cont duonebs.     Problem/Plan - 2:  ·  Problem: Sepsis, due to unspecified organism.  Plan: - resolved; no more leukocytosis, tachycardia  - f/u sputum, blood culture.   - ?RLL PNA, will cont w/ abx for now.     Problem/Plan - 3:  ·  Problem: Acute nonintractable headache, unspecified headache type.  Plan: - resolved.  - neck fully mobile.  - likely from dehydration, cont ot monitor.  - try to avoid nsaids while on steroids if possible.     Problem/Plan - 4:  ·  Problem: Breast asymmetry.  Plan: - Pt provided copy of imaging results regarding asymmetric left breast density.   - Pt reports last mammogram was a few months ago that showed right breast abnormality  - pt to followup with obgyn as outpt for further evaluation.     Problem/Plan - 5:  ·  Problem: Tobacco dependence due to cigarettes.

## 2018-08-22 NOTE — DISCHARGE NOTE ADULT - PATIENT PORTAL LINK FT
You can access the [facility] Patient Portal, offered by [facility], by registering with the following website: [email or web address]

## 2018-08-22 NOTE — PROGRESS NOTE ADULT - PROBLEM SELECTOR PLAN 2
- resolved; no more leukocytosis, tachycardia  - f/u sputum, blood culture.   - ?RLL PNA, will cont w/ abx for now. -Resolved; no more leukocytosis or tachycardia. Mild hypotension reportedly chronic.   -F/u blood cultures; no growth to date.   -RLL PNA seen on CT chest, will c/w abx as above.  -Lactate noted to be elevated to 2.2 this morning, went up to 3.6 this afternoon.   -C/w IVF's. Will give NS bolus of 500cc. Repeat lactate in am.  -Monitor vitals closely.

## 2018-08-22 NOTE — PROGRESS NOTE ADULT - PROBLEM SELECTOR PLAN 4
- Pt provided copy of imaging results regarding asymmetric left breast density.   - Pt reports last mammogram was a few months ago that showed right breast abnormality  - pt to followup with obgyn as outpt for further evaluation -Patient provided copy of imaging results regarding asymmetric left breast density.   -Patient reports last mammogram was a few months ago that showed ?right breast abnormality.  -Patient to followup with gynecologist/PMD as outpatient for further evaluation.

## 2018-08-22 NOTE — PROGRESS NOTE ADULT - SUBJECTIVE AND OBJECTIVE BOX
Patient is a 43y old  Female who presents with a chief complaint of fever, cough, headache (22 Aug 2018 09:42)        SUBJECTIVE / OVERNIGHT EVENTS: Patient reports that her breathing is better today. She says her cough is better today. She denies any CP or N/V or diarrhea.       MEDICATIONS  (STANDING):  ALBUTerol/ipratropium for Nebulization 3 milliLiter(s) Nebulizer every 6 hours  azithromycin  IVPB 500 milliGRAM(s) IV Intermittent every 24 hours  buPROPion XL . 150 milliGRAM(s) Oral daily  cefTRIAXone   IVPB 1 Gram(s) IV Intermittent every 24 hours  docusate sodium 100 milliGRAM(s) Oral three times a day  guaiFENesin  milliGRAM(s) Oral every 12 hours  lactated ringers. 1000 milliLiter(s) (100 mL/Hr) IV Continuous <Continuous>  predniSONE   Tablet 40 milliGRAM(s) Oral daily    MEDICATIONS  (PRN):  acetaminophen   Tablet. 650 milliGRAM(s) Oral every 6 hours PRN Mild Pain (1 - 3)  senna 2 Tablet(s) Oral at bedtime PRN Constipation      Vital Signs Last 24 Hrs  T(C): 36.7 (22 Aug 2018 16:51), Max: 37.3 (22 Aug 2018 12:37)  T(F): 98.1 (22 Aug 2018 16:51), Max: 99.1 (22 Aug 2018 12:37)  HR: 61 (22 Aug 2018 16:51) (56 - 66)  BP: 99/63 (22 Aug 2018 16:51) (89/45 - 99/63)  BP(mean): --  RR: 19 (22 Aug 2018 16:51) (18 - 19)  SpO2: 99% (22 Aug 2018 16:51) (97% - 99%)  CAPILLARY BLOOD GLUCOSE        I&O's Summary    21 Aug 2018 07:01  -  22 Aug 2018 07:00  --------------------------------------------------------  IN: 660 mL / OUT: 0 mL / NET: 660 mL    22 Aug 2018 07:01  -  22 Aug 2018 16:52  --------------------------------------------------------  IN: 860 mL / OUT: 0 mL / NET: 860 mL          PHYSICAL EXAM:  GENERAL: NAD, well-developed  HEAD:  Atraumatic, Normocephalic  EYES: EOMI, PERRLA, conjunctiva and sclera clear  NECK: Supple  CHEST/LUNG: Mild wheeze in bases.   HEART: Regular rate and rhythm; No murmurs, rubs, or gallops  ABDOMEN: Soft, Nontender, Nondistended; Bowel sounds present  EXTREMITIES: No clubbing, cyanosis, or edema  PSYCH/Neuro: AAOx3. Non-focal.   SKIN: No rashes or lesions      LABS:                        11.6   8.49  )-----------( 206      ( 22 Aug 2018 07:32 )             35.7     08-22    143  |  107  |  9   ----------------------------<  88  4.5   |  27  |  0.76    Ca    8.4      22 Aug 2018 05:39    TPro  5.5<L>  /  Alb  3.2<L>  /  TBili  0.2  /  DBili  x   /  AST  18  /  ALT  15  /  AlkPhos  66  08-22    PT/INR - ( 21 Aug 2018 07:51 )   PT: 11.9 sec;   INR: 1.09 ratio         PTT - ( 21 Aug 2018 07:51 )  PTT:33.0 sec          RADIOLOGY & ADDITIONAL TESTS:    Imaging Personally Reviewed:   Consultant(s) Notes Reviewed:    Care Discussed with Consultants/Other Providers: Dr. Katz Patient is a 43y old  Female who presents with a chief complaint of fever, cough, headache (22 Aug 2018 09:42)        SUBJECTIVE / OVERNIGHT EVENTS: Patient reports that her breathing is better today. She says her cough is better today. She denies any CP or N/V or diarrhea.       MEDICATIONS  (STANDING):  ALBUTerol/ipratropium for Nebulization 3 milliLiter(s) Nebulizer every 6 hours  azithromycin  IVPB 500 milliGRAM(s) IV Intermittent every 24 hours  buPROPion XL . 150 milliGRAM(s) Oral daily  cefTRIAXone   IVPB 1 Gram(s) IV Intermittent every 24 hours  docusate sodium 100 milliGRAM(s) Oral three times a day  guaiFENesin  milliGRAM(s) Oral every 12 hours  lactated ringers. 1000 milliLiter(s) (100 mL/Hr) IV Continuous <Continuous>  predniSONE   Tablet 40 milliGRAM(s) Oral daily    MEDICATIONS  (PRN):  acetaminophen   Tablet. 650 milliGRAM(s) Oral every 6 hours PRN Mild Pain (1 - 3)  senna 2 Tablet(s) Oral at bedtime PRN Constipation      Vital Signs Last 24 Hrs  T(C): 36.7 (22 Aug 2018 16:51), Max: 37.3 (22 Aug 2018 12:37)  T(F): 98.1 (22 Aug 2018 16:51), Max: 99.1 (22 Aug 2018 12:37)  HR: 61 (22 Aug 2018 16:51) (56 - 66)  BP: 99/63 (22 Aug 2018 16:51) (89/45 - 99/63)  BP(mean): --  RR: 19 (22 Aug 2018 16:51) (18 - 19)  SpO2: 99% (22 Aug 2018 16:51) (97% - 99%)  CAPILLARY BLOOD GLUCOSE        I&O's Summary    21 Aug 2018 07:01  -  22 Aug 2018 07:00  --------------------------------------------------------  IN: 660 mL / OUT: 0 mL / NET: 660 mL    22 Aug 2018 07:01  -  22 Aug 2018 16:52  --------------------------------------------------------  IN: 860 mL / OUT: 0 mL / NET: 860 mL          PHYSICAL EXAM:  GENERAL: NAD, well-developed  HEAD:  Atraumatic, Normocephalic  EYES: EOMI, PERRLA, conjunctiva and sclera clear  NECK: Supple  CHEST/LUNG: Mild wheeze in bases.   HEART: Regular rate and rhythm; No murmurs, rubs, or gallops  ABDOMEN: Soft, Nontender, Nondistended; Bowel sounds present  EXTREMITIES: No clubbing, cyanosis, or edema  PSYCH/Neuro: AAOx3. Non-focal.   SKIN: No rashes or lesions      LABS:                        11.6   8.49  )-----------( 206      ( 22 Aug 2018 07:32 )             35.7     08-22    143  |  107  |  9   ----------------------------<  88  4.5   |  27  |  0.76    Ca    8.4      22 Aug 2018 05:39    TPro  5.5<L>  /  Alb  3.2<L>  /  TBili  0.2  /  DBili  x   /  AST  18  /  ALT  15  /  AlkPhos  66  08-22    PT/INR - ( 21 Aug 2018 07:51 )   PT: 11.9 sec;   INR: 1.09 ratio         PTT - ( 21 Aug 2018 07:51 )  PTT:33.0 sec      < from: CT Head No Cont (08.21.18 @ 07:52) >  IMPRESSION: Unremarkable noncontrast CT of the brain.    < end of copied text >    < from: CT Angio Chest w/ IV Cont (08.21.18 @ 00:14) >  IMPRESSION:    No pulmonary embolus.  Nonspecific tree-in-bud opacities right lower lobe   which can be on an infectious or inflammatory basis.    Asymmetric left breast tissue. Correlation with mammography is   recommended.    < end of copied text >      RADIOLOGY & ADDITIONAL TESTS:    Imaging Personally Reviewed:   Consultant(s) Notes Reviewed:    Care Discussed with Consultants/Other Providers: Dr. Katz

## 2018-08-22 NOTE — PROGRESS NOTE ADULT - ASSESSMENT
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

## 2018-08-22 NOTE — DISCHARGE NOTE ADULT - SECONDARY DIAGNOSIS.
Pneumonia of right lower lobe due to infectious organism Acute nonintractable headache, unspecified headache type Wnhw-Jrndb-Jldixdx disease, bilateral Tobacco dependence due to cigarettes

## 2018-08-22 NOTE — DISCHARGE NOTE ADULT - PLAN OF CARE
Improved Take all antibiotics as ordered.  Call you Health care provider upon arrival home to make a one week follow up appointment.  If you develop fever, chills, malaise, or change in mental status call your Health Care Provider or go to the Emergency Department.  Nutrition is important, eat small frequent meals to help ensure you get adequate calories.  Do not stay in bed all day!  Increase your activity daily as tolerated. Pneumonia is a lung infection that can cause a fever, cough, and trouble breathing.  Continue all antibiotics as ordered until complete.  Nutrition is important, eat small frequent meals.  Get lots of rest and drink fluids.  Call your health care provider upon arrival home from hospital and make a follow up appointment for one week.  If your cough worsens, you develop fever greater than 101', you have shaking chills, a fast heartbeat, trouble breathing and/or feel your are breathing much faster than usual, call your healthcare provider.  Make sure you wash your hands frequently. resolved continue with pain regimen. Continue taking the Wellbutrin to help you quit smoking. Follow up with Dr. Maradiaga.

## 2018-08-22 NOTE — DISCHARGE NOTE ADULT - CARE PROVIDER_API CALL
Toñito Maradiaga (), Family Medicine  [address] [place] [address]  Phone: [phone]  Fax: [phone]    Gracie Silverman), Critical Care Medicine; Internal Medicine; Pulmonary Disease  [address] [facility] [facility]  [address]  [place] [address]  Phone: [phone]  Fax: [phone]

## 2018-08-22 NOTE — DISCHARGE NOTE ADULT - MEDICATION SUMMARY - MEDICATIONS TO TAKE
I will START or STAY ON the medications listed below when I get home from the hospital:    predniSONE 20 mg oral tablet  -- 2 tab(s) by mouth once a day   -- Indication: For Mskw-Bfhhm-Xuqknux disease, bilateral    albuterol 90 mcg/inh inhalation aerosol  -- 2 puff(s) inhaled every 4 hours, As Needed   -- For inhalation only.  It is very important that you take or use this exactly as directed.  Do not skip doses or discontinue unless directed by your doctor.  Obtain medical advice before taking any non-prescription drugs as some may affect the action of this medication.  Shake well before use.    -- Indication: For Pneumonia of right lower lobe due to infectious organism    cefdinir 300 mg oral capsule  -- 1 cap(s) by mouth every 12 hours   -- Finish all this medication unless otherwise directed by prescriber.    -- Indication: For Pneumonia of right lower lobe due to infectious organism    buPROPion 150 mg/24 hours (XL) oral tablet, extended release  -- 1 tab(s) by mouth 2 times a day   -- Indication: For Tobacco dependence due to cigarettes

## 2018-08-22 NOTE — DISCHARGE NOTE ADULT - OTHER SIGNIFICANT FINDINGS
< from: CT Angio Chest w/ IV Cont (08.21.18 @ 00:14) >  IMPRESSION:    No pulmonary embolus.  Nonspecific tree-in-bud opacities right lower lobe   which can be on an infectious or inflammatory basis.    Asymmetric left breast tissue. Correlation with mammography is   recommended.    < end of copied text >

## 2018-08-23 VITALS
OXYGEN SATURATION: 97 % | SYSTOLIC BLOOD PRESSURE: 94 MMHG | TEMPERATURE: 98 F | DIASTOLIC BLOOD PRESSURE: 59 MMHG | RESPIRATION RATE: 17 BRPM | HEART RATE: 68 BPM

## 2018-08-23 LAB
ANION GAP SERPL CALC-SCNC: 11 MMOL/L — SIGNIFICANT CHANGE UP (ref 5–17)
BUN SERPL-MCNC: 11 MG/DL — SIGNIFICANT CHANGE UP (ref 7–23)
CALCIUM SERPL-MCNC: 8.4 MG/DL — SIGNIFICANT CHANGE UP (ref 8.4–10.5)
CHLORIDE SERPL-SCNC: 105 MMOL/L — SIGNIFICANT CHANGE UP (ref 96–108)
CO2 SERPL-SCNC: 25 MMOL/L — SIGNIFICANT CHANGE UP (ref 22–31)
CREAT SERPL-MCNC: 0.68 MG/DL — SIGNIFICANT CHANGE UP (ref 0.5–1.3)
CULTURE RESULTS: SIGNIFICANT CHANGE UP
GLUCOSE SERPL-MCNC: 78 MG/DL — SIGNIFICANT CHANGE UP (ref 70–99)
HCT VFR BLD CALC: 34.3 % — LOW (ref 34.5–45)
HGB BLD-MCNC: 11.1 G/DL — LOW (ref 11.5–15.5)
LACTATE SERPL-SCNC: 1.3 MMOL/L — SIGNIFICANT CHANGE UP (ref 0.7–2)
LEGIONELLA AG UR QL: NEGATIVE — SIGNIFICANT CHANGE UP
MCHC RBC-ENTMCNC: 31.5 PG — SIGNIFICANT CHANGE UP (ref 27–34)
MCHC RBC-ENTMCNC: 32.3 GM/DL — SIGNIFICANT CHANGE UP (ref 32–36)
MCV RBC AUTO: 97.3 FL — SIGNIFICANT CHANGE UP (ref 80–100)
PLATELET # BLD AUTO: 198 K/UL — SIGNIFICANT CHANGE UP (ref 150–400)
POTASSIUM SERPL-MCNC: 4 MMOL/L — SIGNIFICANT CHANGE UP (ref 3.5–5.3)
POTASSIUM SERPL-SCNC: 4 MMOL/L — SIGNIFICANT CHANGE UP (ref 3.5–5.3)
RBC # BLD: 3.53 M/UL — LOW (ref 3.8–5.2)
RBC # FLD: 11.8 % — SIGNIFICANT CHANGE UP (ref 10.3–14.5)
SODIUM SERPL-SCNC: 141 MMOL/L — SIGNIFICANT CHANGE UP (ref 135–145)
SPECIMEN SOURCE: SIGNIFICANT CHANGE UP
WBC # BLD: 8.4 K/UL — SIGNIFICANT CHANGE UP (ref 3.8–10.5)
WBC # FLD AUTO: 8.4 K/UL — SIGNIFICANT CHANGE UP (ref 3.8–10.5)

## 2018-08-23 PROCEDURE — 71275 CT ANGIOGRAPHY CHEST: CPT

## 2018-08-23 PROCEDURE — 80053 COMPREHEN METABOLIC PANEL: CPT

## 2018-08-23 PROCEDURE — 87581 M.PNEUMON DNA AMP PROBE: CPT

## 2018-08-23 PROCEDURE — 87449 NOS EACH ORGANISM AG IA: CPT

## 2018-08-23 PROCEDURE — 87070 CULTURE OTHR SPECIMN AEROBIC: CPT

## 2018-08-23 PROCEDURE — 85027 COMPLETE CBC AUTOMATED: CPT

## 2018-08-23 PROCEDURE — 87798 DETECT AGENT NOS DNA AMP: CPT

## 2018-08-23 PROCEDURE — 85610 PROTHROMBIN TIME: CPT

## 2018-08-23 PROCEDURE — 70450 CT HEAD/BRAIN W/O DYE: CPT

## 2018-08-23 PROCEDURE — 84295 ASSAY OF SERUM SODIUM: CPT

## 2018-08-23 PROCEDURE — 84132 ASSAY OF SERUM POTASSIUM: CPT

## 2018-08-23 PROCEDURE — 82435 ASSAY OF BLOOD CHLORIDE: CPT

## 2018-08-23 PROCEDURE — 82947 ASSAY GLUCOSE BLOOD QUANT: CPT

## 2018-08-23 PROCEDURE — 96375 TX/PRO/DX INJ NEW DRUG ADDON: CPT | Mod: XU

## 2018-08-23 PROCEDURE — 80048 BASIC METABOLIC PNL TOTAL CA: CPT

## 2018-08-23 PROCEDURE — 71046 X-RAY EXAM CHEST 2 VIEWS: CPT

## 2018-08-23 PROCEDURE — 87486 CHLMYD PNEUM DNA AMP PROBE: CPT

## 2018-08-23 PROCEDURE — 87040 BLOOD CULTURE FOR BACTERIA: CPT

## 2018-08-23 PROCEDURE — 87633 RESP VIRUS 12-25 TARGETS: CPT

## 2018-08-23 PROCEDURE — 99285 EMERGENCY DEPT VISIT HI MDM: CPT

## 2018-08-23 PROCEDURE — 94640 AIRWAY INHALATION TREATMENT: CPT

## 2018-08-23 PROCEDURE — 99239 HOSP IP/OBS DSCHRG MGMT >30: CPT

## 2018-08-23 PROCEDURE — 82803 BLOOD GASES ANY COMBINATION: CPT

## 2018-08-23 PROCEDURE — 85014 HEMATOCRIT: CPT

## 2018-08-23 PROCEDURE — 96374 THER/PROPH/DIAG INJ IV PUSH: CPT | Mod: XU

## 2018-08-23 PROCEDURE — 83605 ASSAY OF LACTIC ACID: CPT

## 2018-08-23 PROCEDURE — 85730 THROMBOPLASTIN TIME PARTIAL: CPT

## 2018-08-23 PROCEDURE — 82330 ASSAY OF CALCIUM: CPT

## 2018-08-23 RX ORDER — BUPROPION HYDROCHLORIDE 150 MG/1
1 TABLET, EXTENDED RELEASE ORAL
Qty: 60 | Refills: 0
Start: 2018-08-23 | End: 2018-09-21

## 2018-08-23 RX ORDER — ALBUTEROL 90 UG/1
2 AEROSOL, METERED ORAL
Qty: 1 | Refills: 0
Start: 2018-08-23 | End: 2018-09-21

## 2018-08-23 RX ORDER — CEFDINIR 250 MG/5ML
1 POWDER, FOR SUSPENSION ORAL
Qty: 8 | Refills: 0
Start: 2018-08-23 | End: 2018-08-26

## 2018-08-23 RX ADMIN — Medication 3 MILLILITER(S): at 06:19

## 2018-08-23 RX ADMIN — AZITHROMYCIN 250 MILLIGRAM(S): 500 TABLET, FILM COATED ORAL at 01:25

## 2018-08-23 RX ADMIN — Medication 600 MILLIGRAM(S): at 09:41

## 2018-08-23 RX ADMIN — Medication 3 MILLILITER(S): at 00:39

## 2018-08-23 RX ADMIN — CEFTRIAXONE 100 GRAM(S): 500 INJECTION, POWDER, FOR SOLUTION INTRAMUSCULAR; INTRAVENOUS at 00:40

## 2018-08-23 RX ADMIN — Medication 40 MILLIGRAM(S): at 06:20

## 2018-08-23 NOTE — PROGRESS NOTE ADULT - ATTENDING COMMENTS
Khai Khanna MD  Division of [facility] Medicine  Cell: [phone]  Pager: [phone]  Office: [phone]
Khai Khanna MD  Division of [facility] Medicine  Cell: [phone]  Pager: [phone]  Office: [phone]
smoking cessation discussed at length, 1 PPD prior, she's now quitting. Not interested in nicotine replacement or chantix.

## 2018-08-23 NOTE — PROGRESS NOTE ADULT - SUBJECTIVE AND OBJECTIVE BOX
Patient is a 43y old  Female who presents with a chief complaint of fever, cough, headache (22 Aug 2018 09:42)        SUBJECTIVE / OVERNIGHT EVENTS: Patient reports her cough and SOB is better. She denies CP or N/V.       MEDICATIONS  (STANDING):  ALBUTerol/ipratropium for Nebulization 3 milliLiter(s) Nebulizer every 6 hours  azithromycin  IVPB 500 milliGRAM(s) IV Intermittent every 24 hours  buPROPion XL . 150 milliGRAM(s) Oral daily  cefTRIAXone   IVPB 1 Gram(s) IV Intermittent every 24 hours  docusate sodium 100 milliGRAM(s) Oral three times a day  guaiFENesin  milliGRAM(s) Oral every 12 hours  lactated ringers. 1000 milliLiter(s) (100 mL/Hr) IV Continuous <Continuous>  predniSONE   Tablet 40 milliGRAM(s) Oral daily    MEDICATIONS  (PRN):  acetaminophen   Tablet. 650 milliGRAM(s) Oral every 6 hours PRN Mild Pain (1 - 3)  senna 2 Tablet(s) Oral at bedtime PRN Constipation      Vital Signs Last 24 Hrs  T(C): 36.9 (23 Aug 2018 04:14), Max: 37.2 (22 Aug 2018 21:08)  T(F): 98.5 (23 Aug 2018 04:14), Max: 98.9 (22 Aug 2018 21:08)  HR: 68 (23 Aug 2018 04:14) (61 - 68)  BP: 94/59 (23 Aug 2018 04:14) (94/59 - 99/63)  BP(mean): --  RR: 17 (23 Aug 2018 04:14) (17 - 19)  SpO2: 97% (23 Aug 2018 04:14) (97% - 99%)  CAPILLARY BLOOD GLUCOSE        I&O's Summary    22 Aug 2018 07:01  -  23 Aug 2018 07:00  --------------------------------------------------------  IN: 3280 mL / OUT: 0 mL / NET: 3280 mL    23 Aug 2018 07:01  -  23 Aug 2018 15:41  --------------------------------------------------------  IN: 480 mL / OUT: 0 mL / NET: 480 mL          PHYSICAL EXAM:   GENERAL: NAD, well-developed  HEAD:  Atraumatic, Normocephalic  EYES: EOMI, PERRLA, conjunctiva and sclera clear  NECK: Supple, No JVD  CHEST/LUNG: Mild scattered wheeze.   HEART: Regular rate and rhythm; No murmurs, rubs, or gallops  ABDOMEN: Soft, Nontender, Nondistended; Bowel sounds present  EXTREMITIES: No clubbing, cyanosis, or edema  PSYCH/Neuro: AAOx3. Non-focal.   SKIN: No rashes or lesions      LABS:                        11.1   8.4   )-----------( 198      ( 23 Aug 2018 06:23 )             34.3     08-23    141  |  105  |  11  ----------------------------<  78  4.0   |  25  |  0.68    Ca    8.4      23 Aug 2018 06:22    TPro  5.5<L>  /  Alb  3.2<L>  /  TBili  0.2  /  DBili  x   /  AST  18  /  ALT  15  /  AlkPhos  66  08-22        RADIOLOGY & ADDITIONAL TESTS:    Imaging Personally Reviewed:   Consultant(s) Notes Reviewed:    Care Discussed with Consultants/Other Providers:

## 2018-08-23 NOTE — PROGRESS NOTE ADULT - PROBLEM SELECTOR PLAN 2
-Resolved; no more leukocytosis or tachycardia. Mild hypotension reportedly chronic.   -F/u blood cultures; no growth to date.   -RLL PNA seen on CT chest, will c/w abx as above.  -Lactate noted to be elevated and went up to 3.6 yesterday. Lactate down to 1.3 today after 1L NS total yesterday and maintenance LR.   -Monitor vitals closely. -Remained stable.

## 2018-08-23 NOTE — PROGRESS NOTE ADULT - PROBLEM SELECTOR PLAN 6
-SCD's for DVT PPx. Ambulate as tolerated.   -Bowel regimen.     7. Dispo: -DC to home today with outpatient PMD follow up within 1-2 weeks. -38 minutes spent on the discharge process.
-SCD's for DVT PPx. Ambulate as tolerated.   -Bowel regimen.     7. Dispo: -Tentative plan for DC to home tomorrow with outpatient follow up as long as patient remains stable/improved.

## 2018-08-23 NOTE — PROGRESS NOTE ADULT - PROBLEM SELECTOR PLAN 5
-Smoking cessation counseling provided.   -Discussed options. Patient does not want NRT, due to previous adverse reactions.  -Said she tried Chantix in the past but it made her feel agitated.   -Zyban seems like a good option for the patient. C/w bupropion 150mg XL daily x 3 days, then BID for about 7-12 weeks. Informed patient that she will need close outpatient PMD follow up for support during the quitting process.
-Smoking cessation counseling provided.   -Discussed options. Patient does not want NRT, due to previous adverse reactions.  -Said she tried Chantix in the past but it made her feel agitated.   -Zyban seems like a good option for the patient. Will start bupropion 150mg XL daily x 3 days, then BID for about 7-12 weeks. Informed patient that she will need close outpatient PMD follow up for support during the quitting process.

## 2018-08-23 NOTE — PROGRESS NOTE ADULT - PROBLEM SELECTOR PROBLEM 3
Acute nonintractable headache, unspecified headache type

## 2018-08-23 NOTE — PROGRESS NOTE ADULT - ASSESSMENT
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

## 2018-08-23 NOTE — PROGRESS NOTE ADULT - PROBLEM SELECTOR PLAN 4
-Patient provided copy of imaging results regarding asymmetric left breast density.   -Patient reports last mammogram was a few months ago that showed ?right breast abnormality.  -Patient advised to followup with gynecologist/PMD as outpatient for further evaluation.

## 2018-08-23 NOTE — PROGRESS NOTE ADULT - PROBLEM SELECTOR PLAN 1
-S/p CTA chest with RLL PNA.  -Clinically with ?COPD exacerbation, though no known diagnosis of COPD and no obvious signs of COPD on CT angio chest. Though smoking history present.   -Patient symptomatically much improved now.   -C/w ceftriaxone for today. Will DC azithromycin since urine legionella negative. 7 day course of abx. Today day 3/7. Will DC patient on cefdinir PO 300mg Q12H x 4 more days.    -Sputum culture grew normal respiratory danita.   -C/w prednisone 40mg PO daily x 5 days total. Today day 2/5. Will send patient with 3 more days.   -C/w Duonebs Q6H standing while inpatient. DC patient on albuterol inhaler 1-2 puffs Q4-6H PRN for wheeze/SOB.     -C/w guaifenesin ER 600mg twice daily for cough.  -Tylenol PRN.

## 2018-08-26 LAB
CULTURE RESULTS: SIGNIFICANT CHANGE UP
CULTURE RESULTS: SIGNIFICANT CHANGE UP
SPECIMEN SOURCE: SIGNIFICANT CHANGE UP
SPECIMEN SOURCE: SIGNIFICANT CHANGE UP

## 2018-10-30 PROBLEM — M91.11: Chronic | Status: ACTIVE | Noted: 2018-08-21

## 2018-11-13 ENCOUNTER — APPOINTMENT (OUTPATIENT)
Dept: GYNECOLOGIC ONCOLOGY | Facility: CLINIC | Age: 43
End: 2018-11-13

## 2018-11-13 ENCOUNTER — APPOINTMENT (OUTPATIENT)
Dept: GYNECOLOGIC ONCOLOGY | Facility: CLINIC | Age: 43
End: 2018-11-13
Payer: COMMERCIAL

## 2018-11-13 DIAGNOSIS — R97.0 ELEVATED CARCINOEMBRYONIC ANTIGEN [CEA]: ICD-10-CM

## 2018-11-13 DIAGNOSIS — N85.00 ENDOMETRIAL HYPERPLASIA, UNSPECIFIED: ICD-10-CM

## 2018-11-13 DIAGNOSIS — Z80.3 FAMILY HISTORY OF MALIGNANT NEOPLASM OF BREAST: ICD-10-CM

## 2018-11-13 DIAGNOSIS — Z80.42 FAMILY HISTORY OF MALIGNANT NEOPLASM OF PROSTATE: ICD-10-CM

## 2018-11-13 DIAGNOSIS — M54.9 DORSALGIA, UNSPECIFIED: ICD-10-CM

## 2018-11-13 DIAGNOSIS — Z83.3 FAMILY HISTORY OF DIABETES MELLITUS: ICD-10-CM

## 2018-11-13 DIAGNOSIS — Z87.891 PERSONAL HISTORY OF NICOTINE DEPENDENCE: ICD-10-CM

## 2018-11-13 DIAGNOSIS — M91.10 JUVENILE OSTEOCHONDROSIS OF HEAD OF FEMUR [LEGG-CALVE-PERTHES], UNSPECIFIED LEG: ICD-10-CM

## 2018-11-13 DIAGNOSIS — Z82.49 FAMILY HISTORY OF ISCHEMIC HEART DISEASE AND OTHER DISEASES OF THE CIRCULATORY SYSTEM: ICD-10-CM

## 2018-11-13 DIAGNOSIS — Z78.9 OTHER SPECIFIED HEALTH STATUS: ICD-10-CM

## 2018-11-13 PROCEDURE — 99205 OFFICE O/P NEW HI 60 MIN: CPT | Mod: 25

## 2018-11-13 PROCEDURE — 76857 US EXAM PELVIC LIMITED: CPT | Mod: 59

## 2018-11-13 PROCEDURE — 93976 VASCULAR STUDY: CPT | Mod: 59

## 2018-11-13 PROCEDURE — 76830 TRANSVAGINAL US NON-OB: CPT | Mod: 59

## 2018-11-13 RX ORDER — FUROSEMIDE 40 MG/1
40 TABLET ORAL
Refills: 0 | Status: ACTIVE | COMMUNITY

## 2018-12-12 ENCOUNTER — APPOINTMENT (OUTPATIENT)
Dept: UROLOGY | Facility: CLINIC | Age: 43
End: 2018-12-12
Payer: COMMERCIAL

## 2018-12-12 VITALS
HEART RATE: 60 BPM | WEIGHT: 114 LBS | DIASTOLIC BLOOD PRESSURE: 64 MMHG | SYSTOLIC BLOOD PRESSURE: 104 MMHG | BODY MASS INDEX: 19.46 KG/M2 | HEIGHT: 64 IN | OXYGEN SATURATION: 95 %

## 2018-12-12 DIAGNOSIS — R31.0 GROSS HEMATURIA: ICD-10-CM

## 2018-12-12 PROCEDURE — 99204 OFFICE O/P NEW MOD 45 MIN: CPT | Mod: 25

## 2018-12-12 PROCEDURE — 52000 CYSTOURETHROSCOPY: CPT

## 2019-01-26 NOTE — ED ADULT TRIAGE NOTE - ACCOMPANIED BY
Group Topic:  Education    Date: January 26  Start Time:  1:00 PM  End Time:  1:50 PM    Focus: Emotional Regulation  Number in attendance: 16   10 PHP + 6 RES    Pt's received education on the benefits of art in stress reduction. Pt's participated in art therapy exercise in which they reflected on 5 positive aspects of their lives and created a visual representation of the 5 aspects. Pt's discussed their projects and how it felt to spend time creating art.   Pt shared her art with the group. Pt stated that she enjoys adult coloring books for stress relief.    Immediate family member

## 2019-03-08 ENCOUNTER — RESULT REVIEW (OUTPATIENT)
Age: 44
End: 2019-03-08

## 2019-04-04 ENCOUNTER — APPOINTMENT (OUTPATIENT)
Dept: GYNECOLOGIC ONCOLOGY | Facility: CLINIC | Age: 44
End: 2019-04-04
Payer: COMMERCIAL

## 2019-04-04 ENCOUNTER — TRANSCRIPTION ENCOUNTER (OUTPATIENT)
Age: 44
End: 2019-04-04

## 2019-04-04 VITALS
OXYGEN SATURATION: 100 % | DIASTOLIC BLOOD PRESSURE: 64 MMHG | HEIGHT: 64 IN | RESPIRATION RATE: 16 BRPM | HEART RATE: 63 BPM | WEIGHT: 114 LBS | SYSTOLIC BLOOD PRESSURE: 110 MMHG | BODY MASS INDEX: 19.46 KG/M2

## 2019-04-04 DIAGNOSIS — R97.8 OTHER ABNORMAL TUMOR MARKERS: ICD-10-CM

## 2019-04-04 PROCEDURE — 99214 OFFICE O/P EST MOD 30 MIN: CPT | Mod: 25

## 2019-04-04 PROCEDURE — 76830 TRANSVAGINAL US NON-OB: CPT | Mod: 59

## 2019-04-04 PROCEDURE — 76857 US EXAM PELVIC LIMITED: CPT | Mod: 59

## 2019-04-04 NOTE — ASSESSMENT
[FreeTextEntry1] : 45 yo with elevated KATHERINE for pre-menopausal female. Follow up US normal. No fluid in the pelvis. Uterus surgically absent. Small functional cysts in the ovaries. Hematuria currently present per patient.

## 2019-04-04 NOTE — PHYSICAL EXAM
[Normal] : No focal neurologic defects observed [Fully active, able to carry on all pre-disease performance without restriction] : Status 0 - Fully active, able to carry on all pre-disease performance without restriction

## 2019-04-04 NOTE — HISTORY OF PRESENT ILLNESS
[FreeTextEntry1] : Pt is a 43 yo with abdominal bloating, elevated KATHERINE for pre-menopausal female who presents for follow up. She had work up with urologist who performed cystoscopy in the office with visualization of talengectasias which are likely the source of bleeding. The patient reports spots on her skin as well, which resolve over a period of weeks. She has not had a work up for A-T. She did not have HE4/ repeated or FSH. She reports poor intake of food and having trouble swallowing sometimes.

## 2019-04-04 NOTE — REVIEW OF SYSTEMS
[Negative] : Musculoskeletal [Hematuria] : hematuria [Nausea] : nausea/vomitting [Normal Sexual Function] : abnormal sexual function [Bloody Stools] : no bloody stools [FreeTextEntry4] : unable to have intercourse [de-identified] : poor food intake, only able to tolerate cereal, banana, + constipation.

## 2019-04-04 NOTE — DISCUSSION/SUMMARY
[FreeTextEntry1] : Regarding elevated KATHERINE. Will evaluate FSH, since if the patient is post-menopausal will be normal. Discussed that elevated HE4 may be due to inflammation going on in the urinary or lung systems also. No abnormality in her ovaries seen on follow up.\par \par Regarding hematuria, it is likely due to telangiectasis in the bladder. The urologist referred her to the hematologist which she will follow up next week. Discussed that she needs to discuss the work up for A-T as a possible diagnosis. She may have a mild version of it. Referral also provided to GI (Dr. Nunes) as she reports white stools occasionally, which could point to an biliary obstruction. All questions answered.

## 2019-04-04 NOTE — END OF VISIT
[FreeTextEntry3] : RTC as needed or in 4-6 months for follow up\par KATHERINE and FSH\par Referral to a new GI for another opinion.

## 2019-04-15 ENCOUNTER — LABORATORY RESULT (OUTPATIENT)
Age: 44
End: 2019-04-15

## 2019-11-08 ENCOUNTER — TRANSCRIPTION ENCOUNTER (OUTPATIENT)
Age: 44
End: 2019-11-08

## 2019-11-28 ENCOUNTER — EMERGENCY (EMERGENCY)
Facility: HOSPITAL | Age: 44
LOS: 1 days | Discharge: HOME | End: 2019-11-28
Attending: EMERGENCY MEDICINE | Admitting: EMERGENCY MEDICINE
Payer: COMMERCIAL

## 2019-11-28 VITALS
HEART RATE: 84 BPM | SYSTOLIC BLOOD PRESSURE: 125 MMHG | TEMPERATURE: 97 F | WEIGHT: 110.01 LBS | RESPIRATION RATE: 18 BRPM | DIASTOLIC BLOOD PRESSURE: 74 MMHG | HEIGHT: 64 IN

## 2019-11-28 VITALS
DIASTOLIC BLOOD PRESSURE: 62 MMHG | RESPIRATION RATE: 16 BRPM | HEART RATE: 55 BPM | OXYGEN SATURATION: 96 % | TEMPERATURE: 98 F | SYSTOLIC BLOOD PRESSURE: 97 MMHG

## 2019-11-28 DIAGNOSIS — Z90.710 ACQUIRED ABSENCE OF BOTH CERVIX AND UTERUS: Chronic | ICD-10-CM

## 2019-11-28 DIAGNOSIS — Z98.84 BARIATRIC SURGERY STATUS: Chronic | ICD-10-CM

## 2019-11-28 DIAGNOSIS — Z98.89 OTHER SPECIFIED POSTPROCEDURAL STATES: Chronic | ICD-10-CM

## 2019-11-28 DIAGNOSIS — Z96.60 PRESENCE OF UNSPECIFIED ORTHOPEDIC JOINT IMPLANT: Chronic | ICD-10-CM

## 2019-11-28 LAB
ALBUMIN SERPL ELPH-MCNC: 3.6 G/DL — SIGNIFICANT CHANGE UP (ref 3.3–5)
ALP SERPL-CCNC: 100 U/L — SIGNIFICANT CHANGE UP (ref 40–120)
ALT FLD-CCNC: 25 U/L — SIGNIFICANT CHANGE UP (ref 12–78)
ANION GAP SERPL CALC-SCNC: 5 MMOL/L — SIGNIFICANT CHANGE UP (ref 5–17)
AST SERPL-CCNC: 24 U/L — SIGNIFICANT CHANGE UP (ref 15–37)
BASOPHILS # BLD AUTO: 0.05 K/UL — SIGNIFICANT CHANGE UP (ref 0–0.2)
BASOPHILS NFR BLD AUTO: 0.7 % — SIGNIFICANT CHANGE UP (ref 0–2)
BILIRUB SERPL-MCNC: 0.5 MG/DL — SIGNIFICANT CHANGE UP (ref 0.2–1.2)
BUN SERPL-MCNC: 8 MG/DL — SIGNIFICANT CHANGE UP (ref 7–23)
CALCIUM SERPL-MCNC: 8.6 MG/DL — SIGNIFICANT CHANGE UP (ref 8.5–10.1)
CHLORIDE SERPL-SCNC: 105 MMOL/L — SIGNIFICANT CHANGE UP (ref 96–108)
CO2 SERPL-SCNC: 30 MMOL/L — SIGNIFICANT CHANGE UP (ref 22–31)
CREAT SERPL-MCNC: 0.81 MG/DL — SIGNIFICANT CHANGE UP (ref 0.5–1.3)
EOSINOPHIL # BLD AUTO: 0.03 K/UL — SIGNIFICANT CHANGE UP (ref 0–0.5)
EOSINOPHIL NFR BLD AUTO: 0.4 % — SIGNIFICANT CHANGE UP (ref 0–6)
GLUCOSE SERPL-MCNC: 93 MG/DL — SIGNIFICANT CHANGE UP (ref 70–99)
HCT VFR BLD CALC: 41.7 % — SIGNIFICANT CHANGE UP (ref 34.5–45)
HGB BLD-MCNC: 13.9 G/DL — SIGNIFICANT CHANGE UP (ref 11.5–15.5)
IMM GRANULOCYTES NFR BLD AUTO: 0.1 % — SIGNIFICANT CHANGE UP (ref 0–1.5)
LIDOCAIN IGE QN: 148 U/L — SIGNIFICANT CHANGE UP (ref 73–393)
LYMPHOCYTES # BLD AUTO: 1.5 K/UL — SIGNIFICANT CHANGE UP (ref 1–3.3)
LYMPHOCYTES # BLD AUTO: 22.5 % — SIGNIFICANT CHANGE UP (ref 13–44)
MCHC RBC-ENTMCNC: 31.2 PG — SIGNIFICANT CHANGE UP (ref 27–34)
MCHC RBC-ENTMCNC: 33.3 GM/DL — SIGNIFICANT CHANGE UP (ref 32–36)
MCV RBC AUTO: 93.5 FL — SIGNIFICANT CHANGE UP (ref 80–100)
MONOCYTES # BLD AUTO: 0.39 K/UL — SIGNIFICANT CHANGE UP (ref 0–0.9)
MONOCYTES NFR BLD AUTO: 5.8 % — SIGNIFICANT CHANGE UP (ref 2–14)
NEUTROPHILS # BLD AUTO: 4.69 K/UL — SIGNIFICANT CHANGE UP (ref 1.8–7.4)
NEUTROPHILS NFR BLD AUTO: 70.5 % — SIGNIFICANT CHANGE UP (ref 43–77)
NRBC # BLD: 0 /100 WBCS — SIGNIFICANT CHANGE UP (ref 0–0)
PLATELET # BLD AUTO: 239 K/UL — SIGNIFICANT CHANGE UP (ref 150–400)
POTASSIUM SERPL-MCNC: 4.4 MMOL/L — SIGNIFICANT CHANGE UP (ref 3.5–5.3)
POTASSIUM SERPL-SCNC: 4.4 MMOL/L — SIGNIFICANT CHANGE UP (ref 3.5–5.3)
PROT SERPL-MCNC: 6.9 G/DL — SIGNIFICANT CHANGE UP (ref 6–8.3)
RBC # BLD: 4.46 M/UL — SIGNIFICANT CHANGE UP (ref 3.8–5.2)
RBC # FLD: 11.9 % — SIGNIFICANT CHANGE UP (ref 10.3–14.5)
SODIUM SERPL-SCNC: 140 MMOL/L — SIGNIFICANT CHANGE UP (ref 135–145)
WBC # BLD: 6.67 K/UL — SIGNIFICANT CHANGE UP (ref 3.8–10.5)
WBC # FLD AUTO: 6.67 K/UL — SIGNIFICANT CHANGE UP (ref 3.8–10.5)

## 2019-11-28 PROCEDURE — 84702 CHORIONIC GONADOTROPIN TEST: CPT

## 2019-11-28 PROCEDURE — 83690 ASSAY OF LIPASE: CPT

## 2019-11-28 PROCEDURE — 80053 COMPREHEN METABOLIC PANEL: CPT

## 2019-11-28 PROCEDURE — 74177 CT ABD & PELVIS W/CONTRAST: CPT

## 2019-11-28 PROCEDURE — 99284 EMERGENCY DEPT VISIT MOD MDM: CPT | Mod: 25

## 2019-11-28 PROCEDURE — 85027 COMPLETE CBC AUTOMATED: CPT

## 2019-11-28 PROCEDURE — 96376 TX/PRO/DX INJ SAME DRUG ADON: CPT

## 2019-11-28 PROCEDURE — 99285 EMERGENCY DEPT VISIT HI MDM: CPT

## 2019-11-28 PROCEDURE — 96374 THER/PROPH/DIAG INJ IV PUSH: CPT | Mod: XU

## 2019-11-28 PROCEDURE — 96375 TX/PRO/DX INJ NEW DRUG ADDON: CPT

## 2019-11-28 PROCEDURE — 36415 COLL VENOUS BLD VENIPUNCTURE: CPT

## 2019-11-28 PROCEDURE — 74177 CT ABD & PELVIS W/CONTRAST: CPT | Mod: 26

## 2019-11-28 RX ORDER — PANTOPRAZOLE SODIUM 20 MG/1
40 TABLET, DELAYED RELEASE ORAL ONCE
Refills: 0 | Status: COMPLETED | OUTPATIENT
Start: 2019-11-28 | End: 2019-11-28

## 2019-11-28 RX ORDER — MORPHINE SULFATE 50 MG/1
2 CAPSULE, EXTENDED RELEASE ORAL ONCE
Refills: 0 | Status: DISCONTINUED | OUTPATIENT
Start: 2019-11-28 | End: 2019-11-28

## 2019-11-28 RX ORDER — MORPHINE SULFATE 50 MG/1
4 CAPSULE, EXTENDED RELEASE ORAL ONCE
Refills: 0 | Status: DISCONTINUED | OUTPATIENT
Start: 2019-11-28 | End: 2019-11-28

## 2019-11-28 RX ORDER — FAMOTIDINE 10 MG/ML
20 INJECTION INTRAVENOUS ONCE
Refills: 0 | Status: COMPLETED | OUTPATIENT
Start: 2019-11-28 | End: 2019-11-28

## 2019-11-28 RX ORDER — ONDANSETRON 8 MG/1
4 TABLET, FILM COATED ORAL ONCE
Refills: 0 | Status: COMPLETED | OUTPATIENT
Start: 2019-11-28 | End: 2019-11-28

## 2019-11-28 RX ORDER — SODIUM CHLORIDE 9 MG/ML
1000 INJECTION INTRAMUSCULAR; INTRAVENOUS; SUBCUTANEOUS ONCE
Refills: 0 | Status: COMPLETED | OUTPATIENT
Start: 2019-11-28 | End: 2019-11-28

## 2019-11-28 RX ADMIN — ONDANSETRON 4 MILLIGRAM(S): 8 TABLET, FILM COATED ORAL at 15:23

## 2019-11-28 RX ADMIN — MORPHINE SULFATE 2 MILLIGRAM(S): 50 CAPSULE, EXTENDED RELEASE ORAL at 17:47

## 2019-11-28 RX ADMIN — SODIUM CHLORIDE 1000 MILLILITER(S): 9 INJECTION INTRAMUSCULAR; INTRAVENOUS; SUBCUTANEOUS at 15:26

## 2019-11-28 RX ADMIN — MORPHINE SULFATE 4 MILLIGRAM(S): 50 CAPSULE, EXTENDED RELEASE ORAL at 16:00

## 2019-11-28 RX ADMIN — PANTOPRAZOLE SODIUM 40 MILLIGRAM(S): 20 TABLET, DELAYED RELEASE ORAL at 15:26

## 2019-11-28 RX ADMIN — MORPHINE SULFATE 4 MILLIGRAM(S): 50 CAPSULE, EXTENDED RELEASE ORAL at 15:24

## 2019-11-28 RX ADMIN — FAMOTIDINE 20 MILLIGRAM(S): 10 INJECTION INTRAVENOUS at 15:29

## 2019-11-28 NOTE — ED ADULT NURSE NOTE - CHPI ED NUR TIMING2
Visit Information    Have you changed or started any medications since your last visit including any over-the-counter medicines, vitamins, or herbal medicines? no   Are you having any side effects from any of your medications? -  no  Have you stopped taking any of your medications? Is so, why? -  no    Have you seen any other physician or provider since your last visit? No  Have you had any other diagnostic tests since your last visit? No  Have you been seen in the emergency room and/or had an admission to a hospital since we last saw you? No  Have you had your routine dental cleaning in the past 6 months? na    Have you activated your [employer] account? If not, what are your barriers?  Yes     Patient Care Team:  Nestor Vegas MD as PCP - General (Family Medicine)  Nestor Vegas MD as PCP - [facility]    Medical History Review  Past Medical, Family, and Social History reviewed and does contribute to the patient presenting condition    Health Maintenance   Topic Date Due    DTaP/Tdap/Td vaccine (1 - Tdap) 01/21/1949    Shingles Vaccine (1 of 2) 01/21/1978    Annual Wellness Visit (AWV)  05/29/2019    Flu vaccine (1) 09/01/2019    Pneumococcal 65+ years Vaccine  Completed sudden onset

## 2019-11-28 NOTE — ED ADULT NURSE NOTE - ED STAT RN HANDOFF DETAILS
Pt stable and resting in bed. Pt denies any pain or discomfort as of this time. Pt handoff report giver to SERAFIN Mart  for continuum of care. No sign of distress noted

## 2019-11-28 NOTE — ED PROVIDER NOTE - PATIENT PORTAL LINK FT
You can access the FollowMyHealth Patient Portal offered by [facility] by registering at the following website: [email or web address]. By joining [facility]’s FollowMyHealth portal, you will also be able to view your health information using other applications (apps) compatible with our system.

## 2019-11-28 NOTE — ED ADULT NURSE NOTE - NSIMPLEMENTINTERV_GEN_ALL_ED
Implemented All Universal Safety Interventions:  [place] to call system. Call bell, personal items and telephone within reach. Instruct patient to call for assistance. Room bathroom lighting operational. Non-slip footwear when patient is off stretcher. Physically safe environment: no spills, clutter or unnecessary equipment. Stretcher in lowest position, wheels locked, appropriate side rails in place.

## 2019-11-28 NOTE — ED PROVIDER NOTE - OBJECTIVE STATEMENT
s/p gastric bypass 6 years ago at [place].  Pt was seen in [place] for same complaints this past Tuesday, had CT abd that was unremarkable.   Similar to stomach ulcer this past August.  lmp- 2006 s/p hysterectomy.  no other complaints. s/p gastric bypass 6 years ago at [place].  Pt was seen in [place] for same complaints this past Tuesday, had CT abd that was unremarkable.   Similar to stomach ulcer this past August.  lmp- 2006 s/p hysterectomy.  no other complaints.  Last month , had ugi endoscopy by Dr. Parry showed ulcers in the stomach.  no other complaints.

## 2019-11-28 NOTE — PROGRESS NOTE ADULT - ASSESSMENT
Discussed with ED attending.  Labs and imaging personally reviewed.  No evidence of obstruction or internal herniation.  Pt had RYGB at [facility] (Dr Masood Lawrence?) 6 years ago.  Labs reviewed and otherwise unremarkable  Should continue PPI's and f/u with Bariatric Services at [place] (Lisa Simmons or Joe) or she may follow up with me at Bariatric offices at [facility].  Revert to soft bland diet and advance to regular as tolerated.

## 2019-11-28 NOTE — ED ADULT NURSE NOTE - OBJECTIVE STATEMENT
Received pt in bed alert and oriented x4.  C/O abdominal pain since yesterday.  Pt having epigastric abdominal pain.  Pt stated she had gastric bypass surgery 6 yrs ago and 2 years ago she was dx with ulcer which hasn't fully healed.  Ongoing nursing care and safety maintained.

## 2019-11-28 NOTE — ED PROVIDER NOTE - PROGRESS NOTE DETAILS
case peter Malagon case peter Malagon.  Bariatric surgeon was at [place] Dr. Pall-Nain. Magdiel Malagon - who reviewed CT scan. pt to continue current meds, outpt with Dr Donell [facility]. Pt states has seen her in past, will call asap for prompt follow-up. Magdiel pt re abd pain prec / inst, in setting of gastric bypass surg - will return with any issues or concenrs.

## 2019-11-28 NOTE — PROGRESS NOTE ADULT - SUBJECTIVE AND OBJECTIVE BOX
13.9   6.67  )-----------( 239      ( 28 Nov 2019 15:31 )             41.7     11-28    140  |  105  |  8   ----------------------------<  93  4.4   |  30  |  0.81    Ca    8.6      28 Nov 2019 15:31    TPro  6.9  /  Alb  3.6  /  TBili  0.5  /  DBili  x   /  AST  24  /  ALT  25  /  AlkPhos  100  11-28        EXAM:  CT ABDOMEN AND PELVIS IC                            PROCEDURE DATE:  11/28/2019          INTERPRETATION:  CLINICAL INFORMATION: Abdominal pain    COMPARISON: CT chest 8/21/2018. MRI pelvis 3/26/2014. CT thoracic lumbar   spine 11/4/2015.    PROCEDURE:   CT of the Abdomen and Pelvis was performed with intravenous contrast.   Intravenous contrast: 90 ml Omnipaque 350. 10 ml discarded.  Oral contrast: Without oral contrast.  Sagittal and coronal reformats were performed.    FINDINGS:    LOWER CHEST: Within normal limits.    LIVER: Within normal limits.  BILE DUCTS: The prominent consistent with postcholecystectomy state  GALLBLADDER: Absent  SPLEEN: Within normal limits.  PANCREAS: Within normal limits.  ADRENALS: Within normal limits.  KIDNEYS/URETERS: Within normal limits.    BLADDER: Within normal limits.  REPRODUCTIVE ORGANS: Hysterectomy.    BOWEL: Status post gastric bypass surgery. The excluded stomach and   proximal duodenum contains a moderate amount of fluid. Contrast is seen   throughout the colon, which is likely from a previous outside exam. Is no   internal hernia. No bowel obstruction. Appendix   PERITONEUM: No ascites.  VESSELS: Within normal limits.  RETROPERITONEUM/LYMPH NODES: No lymphadenopathy.    ABDOMINAL WALL: Within normal limits.  BONES: Bilateral hip prosthesis. Lumbar spinal orthopedic hardware.    IMPRESSION:     Status post gastric bypass surgery. The excluded stomach and proximal   duodenum contains a moderate amount of fluid. Contrast is seen throughout   the colon, which is likely from a previous outside exam. There is no   internal hernia. There is no obstruction.      WILFREDO GOMES M.D., ATTENDING RADIOLOGIST  This document has been electronically signed. Nov 28 2019  5:55PM

## 2020-09-11 ENCOUNTER — EMERGENCY (EMERGENCY)
Facility: HOSPITAL | Age: 45
LOS: 1 days | Discharge: HOME | End: 2020-09-11
Attending: EMERGENCY MEDICINE | Admitting: EMERGENCY MEDICINE
Payer: COMMERCIAL

## 2020-09-11 VITALS
OXYGEN SATURATION: 96 % | SYSTOLIC BLOOD PRESSURE: 125 MMHG | WEIGHT: 113.98 LBS | RESPIRATION RATE: 16 BRPM | TEMPERATURE: 98 F | HEIGHT: 64 IN | HEART RATE: 64 BPM | DIASTOLIC BLOOD PRESSURE: 64 MMHG

## 2020-09-11 VITALS
SYSTOLIC BLOOD PRESSURE: 122 MMHG | HEART RATE: 62 BPM | RESPIRATION RATE: 16 BRPM | DIASTOLIC BLOOD PRESSURE: 68 MMHG | OXYGEN SATURATION: 97 %

## 2020-09-11 DIAGNOSIS — Z96.60 PRESENCE OF UNSPECIFIED ORTHOPEDIC JOINT IMPLANT: Chronic | ICD-10-CM

## 2020-09-11 DIAGNOSIS — Z90.710 ACQUIRED ABSENCE OF BOTH CERVIX AND UTERUS: Chronic | ICD-10-CM

## 2020-09-11 DIAGNOSIS — Z98.89 OTHER SPECIFIED POSTPROCEDURAL STATES: Chronic | ICD-10-CM

## 2020-09-11 DIAGNOSIS — Z98.84 BARIATRIC SURGERY STATUS: Chronic | ICD-10-CM

## 2020-09-11 LAB
ALBUMIN SERPL ELPH-MCNC: 3.5 G/DL — SIGNIFICANT CHANGE UP (ref 3.3–5)
ALP SERPL-CCNC: 116 U/L — SIGNIFICANT CHANGE UP (ref 40–120)
ALT FLD-CCNC: 18 U/L — SIGNIFICANT CHANGE UP (ref 12–78)
ANION GAP SERPL CALC-SCNC: 2 MMOL/L — LOW (ref 5–17)
APPEARANCE UR: CLEAR — SIGNIFICANT CHANGE UP
AST SERPL-CCNC: 20 U/L — SIGNIFICANT CHANGE UP (ref 15–37)
BASOPHILS # BLD AUTO: 0.04 K/UL — SIGNIFICANT CHANGE UP (ref 0–0.2)
BASOPHILS NFR BLD AUTO: 0.6 % — SIGNIFICANT CHANGE UP (ref 0–2)
BILIRUB SERPL-MCNC: 0.4 MG/DL — SIGNIFICANT CHANGE UP (ref 0.2–1.2)
BILIRUB UR-MCNC: NEGATIVE — SIGNIFICANT CHANGE UP
BUN SERPL-MCNC: 8 MG/DL — SIGNIFICANT CHANGE UP (ref 7–23)
CALCIUM SERPL-MCNC: 9 MG/DL — SIGNIFICANT CHANGE UP (ref 8.5–10.1)
CHLORIDE SERPL-SCNC: 105 MMOL/L — SIGNIFICANT CHANGE UP (ref 96–108)
CO2 SERPL-SCNC: 33 MMOL/L — HIGH (ref 22–31)
COLOR SPEC: YELLOW — SIGNIFICANT CHANGE UP
CREAT SERPL-MCNC: 0.67 MG/DL — SIGNIFICANT CHANGE UP (ref 0.5–1.3)
D DIMER BLD IA.RAPID-MCNC: <150 NG/ML DDU — SIGNIFICANT CHANGE UP
DIFF PNL FLD: NEGATIVE — SIGNIFICANT CHANGE UP
EOSINOPHIL # BLD AUTO: 0.04 K/UL — SIGNIFICANT CHANGE UP (ref 0–0.5)
EOSINOPHIL NFR BLD AUTO: 0.6 % — SIGNIFICANT CHANGE UP (ref 0–6)
GLUCOSE SERPL-MCNC: 93 MG/DL — SIGNIFICANT CHANGE UP (ref 70–99)
GLUCOSE UR QL: NEGATIVE — SIGNIFICANT CHANGE UP
HCG SERPL-ACNC: 2 MIU/ML — SIGNIFICANT CHANGE UP
HCT VFR BLD CALC: 45.3 % — HIGH (ref 34.5–45)
HGB BLD-MCNC: 15 G/DL — SIGNIFICANT CHANGE UP (ref 11.5–15.5)
IMM GRANULOCYTES NFR BLD AUTO: 0.1 % — SIGNIFICANT CHANGE UP (ref 0–1.5)
KETONES UR-MCNC: NEGATIVE — SIGNIFICANT CHANGE UP
LEUKOCYTE ESTERASE UR-ACNC: NEGATIVE — SIGNIFICANT CHANGE UP
LIDOCAIN IGE QN: 768 U/L — HIGH (ref 73–393)
LYMPHOCYTES # BLD AUTO: 2.13 K/UL — SIGNIFICANT CHANGE UP (ref 1–3.3)
LYMPHOCYTES # BLD AUTO: 30.5 % — SIGNIFICANT CHANGE UP (ref 13–44)
MAGNESIUM SERPL-MCNC: 2.2 MG/DL — SIGNIFICANT CHANGE UP (ref 1.6–2.6)
MCHC RBC-ENTMCNC: 31.2 PG — SIGNIFICANT CHANGE UP (ref 27–34)
MCHC RBC-ENTMCNC: 33.1 GM/DL — SIGNIFICANT CHANGE UP (ref 32–36)
MCV RBC AUTO: 94.2 FL — SIGNIFICANT CHANGE UP (ref 80–100)
MONOCYTES # BLD AUTO: 0.53 K/UL — SIGNIFICANT CHANGE UP (ref 0–0.9)
MONOCYTES NFR BLD AUTO: 7.6 % — SIGNIFICANT CHANGE UP (ref 2–14)
NEUTROPHILS # BLD AUTO: 4.24 K/UL — SIGNIFICANT CHANGE UP (ref 1.8–7.4)
NEUTROPHILS NFR BLD AUTO: 60.6 % — SIGNIFICANT CHANGE UP (ref 43–77)
NITRITE UR-MCNC: NEGATIVE — SIGNIFICANT CHANGE UP
NRBC # BLD: 0 /100 WBCS — SIGNIFICANT CHANGE UP (ref 0–0)
PH UR: 7 — SIGNIFICANT CHANGE UP (ref 5–8)
PHOSPHATE SERPL-MCNC: 3.9 MG/DL — SIGNIFICANT CHANGE UP (ref 2.5–4.5)
PLATELET # BLD AUTO: 262 K/UL — SIGNIFICANT CHANGE UP (ref 150–400)
POTASSIUM SERPL-MCNC: 4.7 MMOL/L — SIGNIFICANT CHANGE UP (ref 3.5–5.3)
POTASSIUM SERPL-SCNC: 4.7 MMOL/L — SIGNIFICANT CHANGE UP (ref 3.5–5.3)
PROT SERPL-MCNC: 6.7 G/DL — SIGNIFICANT CHANGE UP (ref 6–8.3)
PROT UR-MCNC: NEGATIVE — SIGNIFICANT CHANGE UP
RBC # BLD: 4.81 M/UL — SIGNIFICANT CHANGE UP (ref 3.8–5.2)
RBC # FLD: 12.2 % — SIGNIFICANT CHANGE UP (ref 10.3–14.5)
SARS-COV-2 RNA SPEC QL NAA+PROBE: SIGNIFICANT CHANGE UP
SODIUM SERPL-SCNC: 140 MMOL/L — SIGNIFICANT CHANGE UP (ref 135–145)
SP GR SPEC: 1 — LOW (ref 1.01–1.02)
TROPONIN I SERPL-MCNC: <.015 NG/ML — SIGNIFICANT CHANGE UP (ref 0.01–0.04)
UROBILINOGEN FLD QL: 1
WBC # BLD: 6.99 K/UL — SIGNIFICANT CHANGE UP (ref 3.8–10.5)
WBC # FLD AUTO: 6.99 K/UL — SIGNIFICANT CHANGE UP (ref 3.8–10.5)

## 2020-09-11 PROCEDURE — 99284 EMERGENCY DEPT VISIT MOD MDM: CPT | Mod: 25

## 2020-09-11 PROCEDURE — 96375 TX/PRO/DX INJ NEW DRUG ADDON: CPT

## 2020-09-11 PROCEDURE — 84484 ASSAY OF TROPONIN QUANT: CPT

## 2020-09-11 PROCEDURE — 85379 FIBRIN DEGRADATION QUANT: CPT

## 2020-09-11 PROCEDURE — 85025 COMPLETE CBC W/AUTO DIFF WBC: CPT

## 2020-09-11 PROCEDURE — 83735 ASSAY OF MAGNESIUM: CPT

## 2020-09-11 PROCEDURE — 71045 X-RAY EXAM CHEST 1 VIEW: CPT | Mod: 26

## 2020-09-11 PROCEDURE — 84702 CHORIONIC GONADOTROPIN TEST: CPT

## 2020-09-11 PROCEDURE — 84100 ASSAY OF PHOSPHORUS: CPT

## 2020-09-11 PROCEDURE — U0003: CPT

## 2020-09-11 PROCEDURE — 71045 X-RAY EXAM CHEST 1 VIEW: CPT

## 2020-09-11 PROCEDURE — 74018 RADEX ABDOMEN 1 VIEW: CPT | Mod: 26

## 2020-09-11 PROCEDURE — 99285 EMERGENCY DEPT VISIT HI MDM: CPT

## 2020-09-11 PROCEDURE — 83690 ASSAY OF LIPASE: CPT

## 2020-09-11 PROCEDURE — 74177 CT ABD & PELVIS W/CONTRAST: CPT | Mod: 26

## 2020-09-11 PROCEDURE — 74177 CT ABD & PELVIS W/CONTRAST: CPT

## 2020-09-11 PROCEDURE — 96361 HYDRATE IV INFUSION ADD-ON: CPT

## 2020-09-11 PROCEDURE — 94640 AIRWAY INHALATION TREATMENT: CPT

## 2020-09-11 PROCEDURE — 36415 COLL VENOUS BLD VENIPUNCTURE: CPT

## 2020-09-11 PROCEDURE — 96374 THER/PROPH/DIAG INJ IV PUSH: CPT | Mod: XU

## 2020-09-11 PROCEDURE — 81003 URINALYSIS AUTO W/O SCOPE: CPT

## 2020-09-11 PROCEDURE — 93005 ELECTROCARDIOGRAM TRACING: CPT

## 2020-09-11 PROCEDURE — 74018 RADEX ABDOMEN 1 VIEW: CPT

## 2020-09-11 PROCEDURE — 80053 COMPREHEN METABOLIC PANEL: CPT

## 2020-09-11 PROCEDURE — 93010 ELECTROCARDIOGRAM REPORT: CPT

## 2020-09-11 RX ORDER — SODIUM CHLORIDE 9 MG/ML
50 INJECTION, SOLUTION INTRAVENOUS
Refills: 0 | Status: COMPLETED | OUTPATIENT
Start: 2020-09-11 | End: 2020-09-11

## 2020-09-11 RX ORDER — IPRATROPIUM/ALBUTEROL SULFATE 18-103MCG
3 AEROSOL WITH ADAPTER (GRAM) INHALATION ONCE
Refills: 0 | Status: COMPLETED | OUTPATIENT
Start: 2020-09-11 | End: 2020-09-11

## 2020-09-11 RX ORDER — SUCRALFATE 1 G
1 TABLET ORAL ONCE
Refills: 0 | Status: COMPLETED | OUTPATIENT
Start: 2020-09-11 | End: 2020-09-11

## 2020-09-11 RX ORDER — DULOXETINE HYDROCHLORIDE 30 MG/1
2 CAPSULE, DELAYED RELEASE ORAL
Qty: 0 | Refills: 0 | DISCHARGE

## 2020-09-11 RX ORDER — OMEPRAZOLE 10 MG/1
1 CAPSULE, DELAYED RELEASE ORAL
Qty: 0 | Refills: 0 | DISCHARGE

## 2020-09-11 RX ORDER — SODIUM CHLORIDE 9 MG/ML
1000 INJECTION INTRAMUSCULAR; INTRAVENOUS; SUBCUTANEOUS ONCE
Refills: 0 | Status: COMPLETED | OUTPATIENT
Start: 2020-09-11 | End: 2020-09-11

## 2020-09-11 RX ORDER — PREGABALIN 225 MG/1
1 CAPSULE ORAL
Qty: 0 | Refills: 0 | DISCHARGE

## 2020-09-11 RX ORDER — GABAPENTIN 400 MG/1
2 CAPSULE ORAL
Qty: 0 | Refills: 0 | DISCHARGE

## 2020-09-11 RX ORDER — ASCORBIC ACID 60 MG
1 TABLET,CHEWABLE ORAL
Qty: 0 | Refills: 0 | DISCHARGE

## 2020-09-11 RX ORDER — ONDANSETRON 8 MG/1
1 TABLET, FILM COATED ORAL
Qty: 12 | Refills: 0
Start: 2020-09-11 | End: 2020-09-14

## 2020-09-11 RX ORDER — FAMOTIDINE 10 MG/ML
20 INJECTION INTRAVENOUS ONCE
Refills: 0 | Status: COMPLETED | OUTPATIENT
Start: 2020-09-11 | End: 2020-09-11

## 2020-09-11 RX ORDER — MORPHINE SULFATE 50 MG/1
4 CAPSULE, EXTENDED RELEASE ORAL ONCE
Refills: 0 | Status: DISCONTINUED | OUTPATIENT
Start: 2020-09-11 | End: 2020-09-11

## 2020-09-11 RX ORDER — TRAMADOL HYDROCHLORIDE 50 MG/1
1 TABLET ORAL
Qty: 12 | Refills: 0
Start: 2020-09-11 | End: 2020-09-13

## 2020-09-11 RX ORDER — ONDANSETRON 8 MG/1
4 TABLET, FILM COATED ORAL ONCE
Refills: 0 | Status: COMPLETED | OUTPATIENT
Start: 2020-09-11 | End: 2020-09-11

## 2020-09-11 RX ORDER — GABAPENTIN 400 MG/1
1 CAPSULE ORAL
Qty: 0 | Refills: 0 | DISCHARGE

## 2020-09-11 RX ORDER — SODIUM CHLORIDE 9 MG/ML
100 INJECTION, SOLUTION INTRAVENOUS
Refills: 0 | Status: COMPLETED | OUTPATIENT
Start: 2020-09-11 | End: 2020-09-11

## 2020-09-11 RX ORDER — HYDROMORPHONE HYDROCHLORIDE 2 MG/ML
0.5 INJECTION INTRAMUSCULAR; INTRAVENOUS; SUBCUTANEOUS ONCE
Refills: 0 | Status: DISCONTINUED | OUTPATIENT
Start: 2020-09-11 | End: 2020-09-11

## 2020-09-11 RX ADMIN — Medication 3 MILLILITER(S): at 14:12

## 2020-09-11 RX ADMIN — MORPHINE SULFATE 4 MILLIGRAM(S): 50 CAPSULE, EXTENDED RELEASE ORAL at 13:57

## 2020-09-11 RX ADMIN — SODIUM CHLORIDE 1000 MILLILITER(S): 9 INJECTION INTRAMUSCULAR; INTRAVENOUS; SUBCUTANEOUS at 14:44

## 2020-09-11 RX ADMIN — ONDANSETRON 4 MILLIGRAM(S): 8 TABLET, FILM COATED ORAL at 13:43

## 2020-09-11 RX ADMIN — SODIUM CHLORIDE 100 MILLILITER(S): 9 INJECTION, SOLUTION INTRAVENOUS at 15:18

## 2020-09-11 RX ADMIN — SODIUM CHLORIDE 1000 MILLILITER(S): 9 INJECTION INTRAMUSCULAR; INTRAVENOUS; SUBCUTANEOUS at 13:44

## 2020-09-11 RX ADMIN — Medication 1 GRAM(S): at 18:24

## 2020-09-11 RX ADMIN — Medication 30 MILLILITER(S): at 18:24

## 2020-09-11 RX ADMIN — FAMOTIDINE 20 MILLIGRAM(S): 10 INJECTION INTRAVENOUS at 13:44

## 2020-09-11 RX ADMIN — SODIUM CHLORIDE 50 MILLILITER(S): 9 INJECTION, SOLUTION INTRAVENOUS at 14:40

## 2020-09-11 RX ADMIN — MORPHINE SULFATE 4 MILLIGRAM(S): 50 CAPSULE, EXTENDED RELEASE ORAL at 13:43

## 2020-09-11 RX ADMIN — SODIUM CHLORIDE 50 MILLILITER(S): 9 INJECTION, SOLUTION INTRAVENOUS at 14:45

## 2020-09-11 RX ADMIN — HYDROMORPHONE HYDROCHLORIDE 0.5 MILLIGRAM(S): 2 INJECTION INTRAMUSCULAR; INTRAVENOUS; SUBCUTANEOUS at 15:11

## 2020-09-11 RX ADMIN — HYDROMORPHONE HYDROCHLORIDE 0.5 MILLIGRAM(S): 2 INJECTION INTRAMUSCULAR; INTRAVENOUS; SUBCUTANEOUS at 14:55

## 2020-09-11 RX ADMIN — SODIUM CHLORIDE 100 MILLILITER(S): 9 INJECTION, SOLUTION INTRAVENOUS at 14:16

## 2020-09-11 NOTE — ED PROVIDER NOTE - PATIENT PORTAL LINK FT
You can access the FollowMyHealth Patient Portal offered by [facility] by registering at the following website: [email or web address]. By joining [employer]’s FollowMyHealth portal, you will also be able to view your health information using other applications (apps) compatible with our system.

## 2020-09-11 NOTE — ED PROVIDER NOTE - NONTENDER LOCATION
umbilical/periumbilical/left costovertebral angle/left lower quadrant/suprapubic/right costovertebral angle/right lower quadrant

## 2020-09-11 NOTE — ED PROVIDER NOTE - DISCUSSED CASE WITH MULTISELECT OPTIONS
Consultant/Other/Admitting MD

## 2020-09-11 NOTE — ED PROVIDER NOTE - CLINICAL SUMMARY MEDICAL DECISION MAKING FREE TEXT BOX
pt is a 46yo female with pmhx of scurvy, gastric bypass (many years ago at [place]), PUD, presents with chest pain x today. pt reports diffuse upper abd pain radiating to her back with nausea without vomiting and dry heaves. pt reports she did not take anything for pain. will do labs, lipase to r/o pancreatitis, trop, ekg, cxr, ct abd/pelvis, ivf, pain control,

## 2020-09-11 NOTE — ED PROVIDER NOTE - ATTENDING CONTRIBUTION TO CARE
46 yo f who has hx of bariatric surg gastric bypass- pud and subsequent issues with nutrition, she has asthma. presents with abd pain nausea vomiting and wheezing no fever no chills she describes pain as r sided and epigastric. in addition to bypass she has had gb removal, hysterectomy.  on eval  thin very tanned w female nad  heent nc at [facility] no g f r anicteric  cor rrr  chest end exp wheeze no accessory muscle use  abd soft but has tender to palp on r side epigastrium and bl cvat  ext normal  neuro normal  skin normal  plan ct labs pain control nebs ppi ro perforation retained stone pancreatitis or other causes of this complex pt sx

## 2020-09-11 NOTE — ED PROVIDER NOTE - CARE PLAN
Principal Discharge DX:	Abdominal pain  Secondary Diagnosis:	Epigastric pain  Secondary Diagnosis:	Elevated lipase

## 2020-09-11 NOTE — ED PROVIDER NOTE - PROVIDER TOKENS
PROVIDER:[TOKEN:[[id number]],FOLLOWUP:[1-3 Days]],PROVIDER:[TOKEN:[[id number]],FOLLOWUP:[1-3 Days]],PROVIDER:[TOKEN:[[id number]],FOLLOWUP:[1-3 Days]]

## 2020-09-11 NOTE — ED PROVIDER NOTE - CARE PROVIDER_API CALL
Wiley Malagon  SURGERY  [address] [place] [address]  Phone: [phone]  Fax: [phone]  Follow Up Time: 1-3 Days    Glenn Liu (DO)  Internal Medicine  [address] [place] [address]  Phone: [phone]  Fax: [phone]  Follow Up Time: 1-3 Days    Dawit Toussaint  CARDIOVASCULAR DISEASE  [address] [facility] [address]  [place] [address]  Phone: [phone]  Fax: [phone]  Follow Up Time: 1-3 Days

## 2020-09-11 NOTE — ED PROVIDER NOTE - PROGRESS NOTE DETAILS
surgery wilson mobley consulted will come see pt in ed. gi consulted pending call back. consulted hospitalist for admission. dr kline advised to wait to surgery eval. surgery wilson mobley advised pt can follow up with dr mckeon out pt. consulted hospitalist for admission, dr kline advised he spoke to gi dr oates who advised pt can follow up in office monday. dr kline advised no need to admit pt at this time. will medicate pt and re-eval pt still in pain. offered pt admission to work up pain, pt advised she does not want to wait for GI to come since it may possibly take 72 hours to be seen?? advised pt should stay to get further testing (especially to eval for vitamin deficiencies). pt refused admission. will rx pain control and zofran. advised gi and cardio follow up as well as bariatric surgery. All imaging and labs reviewed. all results reviewed with pt including abnormal results. pt given a copy of results. pt advised to follow up with pmd regarding abnormal results. All questions answered and concerns addressed. pt verbalized understanding and agreement with plan and dx. pt advised on next step and when/where to follow up. pt advised on all take home and otc medications. pt advised to follow up with PMD. pt advised to return to ed for worsenng symptoms including fever, cp, sob. will dc.

## 2020-09-11 NOTE — ED PROVIDER NOTE - OBJECTIVE STATEMENT
pt is a 44yo female with pmhx of scurvy, gastric bypass (many years ago at [place]), PUD, presents with chest pain x today. pt is a 44yo female with pmhx of scurvy, gastric bypass (many years ago at [place]), PUD, presents with chest pain x today. pt reports diffuse upper abd pain radiating to her back with nausea without vomiting and dry heaves. pt reports she did not take anything for pain. pt denies fever, sob, diarrhea.

## 2020-09-11 NOTE — CONSULT NOTE ADULT - SUBJECTIVE AND OBJECTIVE BOX
Surgery Consultation    This is a 45y year old Female presenting with complaint of RUQ pain for past radiating to the. Pt states pain started. Admits to taking to alleviate the symptoms. Last BM was. Pt admits to . Denies history of, chest pain, shortness of breath, uninary complaints.     () Nausea  ()Vomiting  ()BM    PAST MEDICAL & SURGICAL HISTORY:  Vash-Qoren-Vodkoew disease, bilateral  Neuropathy  H/O gastric bypass  History of hysterectomy  S/P laminectomy  S/P hip replacement: bilateral    Allergies:  No Known Allergies    Home Medications:      Family History:  FAMILY HISTORY:  No pertinent family history in first degree relatives      ROS:  Constitutional: Denies fever, fatigue or weight loss.  Skin: Denies rash.  Eyes: Denies recent vision problems or eye pain.  ENT: Denies congestion, ear pain, or sore throat.  Endocrine: Denies thyroid problems.  Cardiovascular: Denies chest pain or palpation.  Respiratory: Denies cough, shortness of breath, congestion, or wheezing.  Gastrointestinal: Denies abdominal pain, nausea, vomiting, or diarrhea.  Genitourinary: Denies dysuria.  Musculoskeletal: Denies joint swelling.  Neurologic: Denies headache.    PHYSICAL EXAM:  GENERAL: No acute distress, well-developed  HEAD:  Atraumatic, Normocephalic  CHEST/LUNG: CTAB; No wheezes, rales, or rhonchi  HEART: Regular rate and rhythm; No murmurs, rubs, or gallops  ABDOMEN: Soft, moderately tender epigastrium, non-distended; bowel sounds+  NEUROLOGY: A&O x 3, no focal deficits    Data:  T(C): 36.9 (20 @ 15:11), Max: 36.9 (20 @ 13:14)  HR: 64 (20 @ 15:11) (62 - 64)  BP: 116/66 (20 @ 15:11) (116/66 - 125/64)  RR: 16 (20 @ 15:11) (16 - 16)  SpO2: 97% (20 @ 15:11) (96% - 97%)                        15.0   6.99  )-----------( 262      ( 11 Sep 2020 13:38 )             45.3         140  |  105  |  8   ----------------------------<  93  4.7   |  33<H>  |  0.67    Ca    9.0      11 Sep 2020 13:38  Phos  3.9       Mg     2.2         TPro  6.7  /  Alb  3.5  /  TBili  0.4  /  DBili  x   /  AST  20  /  ALT  18  /  AlkPhos  116        LIVER FUNCTIONS - ( 11 Sep 2020 13:38 )  Alb: 3.5 g/dL / Pro: 6.7 g/dL / ALK PHOS: 116 U/L / ALT: 18 U/L / AST: 20 U/L / GGT: x           Urinalysis Basic - ( 11 Sep 2020 16:53 )    Color: Yellow / Appearance: Clear / S.005 / pH: x  Gluc: x / Ketone: Negative  / Bili: Negative / Urobili: 1   Blood: x / Protein: Negative / Nitrite: Negative   Leuk Esterase: Negative / RBC: x / WBC x   Sq Epi: x / Non Sq Epi: x / Bacteria: x    Radiology:  < from: CT Abdomen and Pelvis w/ IV Cont (20 @ 15:45) >  FINDINGS:  LOWER CHEST: Within normal limits.    LIVER: Within normal limits.  BILE DUCTS: Stable biliary dilatation status post cholecystectomy  GALLBLADDER: Cholecystectomy.  SPLEEN: Within normal limits.  PANCREAS: Within normal limits.  ADRENALS: Within normal limits.  KIDNEYS/URETERS: Within normal limits.    BLADDER: Obscured by artifact from bilateral THR  REPRODUCTIVE ORGANS: Obscured by artifact from bilateral THR    BOWEL: Evaluation of the alimentary tube is limited by nonopacification underdistention. No gross bowel inflammation. Status post gastric bypass similar appearance to prior with a moderate amount of fluid in the excluded portion of the stomach.. If there is a clinical suspicion for possible gastrogastric fistula consider upper GI series or follow-up CT with oral contrast No bowel obstruction. No evidence of internal hernia. Appendix no appendicitis  PERITONEUM: No ascites.  VESSELS: Within normal limits.  RETROPERITONEUM/LYMPH NODES: No lymphadenopathy.  ABDOMINAL WALL: Within normal limits.  BONES: No acute finding. Status post L4-S1 fusion. Bilateral THR    IMPRESSION:  Limited by lack of oral contrast  No acute inflammatory or obstructive pathology.  Status post gastric bypass. Moderate fluid in the excluded stomach similar to prior. See discussion above.  No evidence of internal hernia.    < end of copied text > Surgery Consultation    This is a 45y year old Female with PMHx of scurvy, PUD, malnutrition, s/p RYGB (Dr. Masood Lawrence, 2013), s/p lap julio (), s/p  x3, s/p partial hysterectomy for endometriosis (), s/p lipoma removal (Dr. Alicia Diaz, 2018) presenting with sudden-onset epigastric pain since last night with associated nausea and dry heaving. Pain described as 40/10, radiating from epigastrium to back  to sides. Pt states pain and constellation of symptoms is similar to pain she has had in past, has been to multiple hospitals over the past 2 years with subsequent imaging, EGD/colonoscopy, workup without any clear source of pain. Approximately 6-8 EGDs over past 2 years. On two separate occasions pt was loosely diagnosed intussusception with spontaneous resolution with conservative management. Pt has had malnutrition/bariatric follow-up primarily with PCP Dr. Maradiaga and heme Dr. Kyle where she has been receiving Vit C 500mg qd, B12 35K units PO qd and IV B12 k2mxast, Iron IV k4txldk. [facility] but has not had routine f/u recently / COVID availability. Last BM was Monday, described as watery with skinny pieces. Admits to headache. Denies history of shortness of breath, fevers, chills, vomiting, urinary complaints.     PAST MEDICAL & SURGICAL HISTORY:  Jftb-Hbxsa-Ffhupln disease, bilateral  Neuropathy  H/O gastric bypass  History of hysterectomy  S/P laminectomy  S/P hip replacement: bilateral    Allergies:  No Known Allergies    Home Medications:    FAMILY HISTORY:  No pertinent family history in first degree relatives      ROS:  Constitutional: +HA. Denies fever, fatigue or weight loss.  Skin: Denies rash.  Eyes: Denies recent vision problems or eye pain.  ENT: Denies congestion, ear pain, or sore throat.  Endocrine: Denies thyroid problems.  Cardiovascular: Denies chest pain or palpation.  Respiratory: Denies cough, shortness of breath, congestion, or wheezing.  Gastrointestinal: +epigastric abdominal pain, nausea. Denies vomiting.  Genitourinary: Denies dysuria.  Musculoskeletal: Denies joint swelling.  Neurologic: Denies headache.    PHYSICAL EXAM:  GENERAL: No acute distress, well-developed  HEAD:  Atraumatic, Normocephalic  CHEST/LUNG: CTAB; No wheezes, rales, or rhonchi  HEART: Regular rate and rhythm; No murmurs, rubs, or gallops  ABDOMEN: Soft, moderately tender epigastrium, non-distended; bowel sounds+  NEUROLOGY: A&O x 3, no focal deficits    Data:  T(C): 36.9 (20 @ 15:11), Max: 36.9 (20 @ 13:14)  HR: 64 (20 @ 15:11) (62 - 64)  BP: 116/66 (20 @ 15:11) (116/66 - 125/64)  RR: 16 (20 @ 15:11) (16 - 16)  SpO2: 97% (20 @ 15:11) (96% - 97%)                        15.0   6.99  )-----------( 262      ( 11 Sep 2020 13:38 )             45.3         140  |  105  |  8   ----------------------------<  93  4.7   |  33<H>  |  0.67    Ca    9.0      11 Sep 2020 13:38  Phos  3.9       Mg     2.2         TPro  6.7  /  Alb  3.5  /  TBili  0.4  /  DBili  x   /  AST  20  /  ALT  18  /  AlkPhos  116        LIVER FUNCTIONS - ( 11 Sep 2020 13:38 )  Alb: 3.5 g/dL / Pro: 6.7 g/dL / ALK PHOS: 116 U/L / ALT: 18 U/L / AST: 20 U/L / GGT: x           Urinalysis Basic - ( 11 Sep 2020 16:53 )    Color: Yellow / Appearance: Clear / S.005 / pH: x  Gluc: x / Ketone: Negative  / Bili: Negative / Urobili: 1   Blood: x / Protein: Negative / Nitrite: Negative   Leuk Esterase: Negative / RBC: x / WBC x   Sq Epi: x / Non Sq Epi: x / Bacteria: x    Radiology:  < from: CT Abdomen and Pelvis w/ IV Cont (20 @ 15:45) >  FINDINGS:  LOWER CHEST: Within normal limits.    LIVER: Within normal limits.  BILE DUCTS: Stable biliary dilatation status post cholecystectomy  GALLBLADDER: Cholecystectomy.  SPLEEN: Within normal limits.  PANCREAS: Within normal limits.  ADRENALS: Within normal limits.  KIDNEYS/URETERS: Within normal limits.    BLADDER: Obscured by artifact from bilateral THR  REPRODUCTIVE ORGANS: Obscured by artifact from bilateral THR    BOWEL: Evaluation of the alimentary tube is limited by non-opacification underdistention. No gross bowel inflammation. Status post gastric bypass similar appearance to prior with a moderate amount of fluid in the excluded portion of the stomach.. If there is a clinical suspicion for possible gastrogastric fistula consider upper GI series or follow-up CT with oral contrast No bowel obstruction. No evidence of internal hernia. Appendix no appendicitis  PERITONEUM: No ascites.  VESSELS: Within normal limits.  RETROPERITONEUM/LYMPH NODES: No lymphadenopathy.  ABDOMINAL WALL: Within normal limits.  BONES: No acute finding. Status post L4-S1 fusion. Bilateral THR    IMPRESSION:  Limited by lack of oral contrast  No acute inflammatory or obstructive pathology.  Status post gastric bypass. Moderate fluid in the excluded stomach similar to prior. See discussion above.  No evidence of internal hernia.    < end of copied text >

## 2020-09-11 NOTE — ED ADULT NURSE NOTE - NEURO MENTATION
Ventricular Rate : 117   Atrial Rate : 117   P-R Interval : 158   QRS Duration : 80   Q-T Interval : 334   QTC Calculation(Bezet) : 465   P Axis : 76   R Axis : 67   T Axis : 19   Diagnosis : Sinus tachycardia~Nonspecific ST and T wave abnormality~****Abnormal ECG****~No previous ECGs available~Confirmed by JOSE BERGER MAITRAYEE ([id number]) on 9/4/2018 1:32:32 PM      normal

## 2020-09-11 NOTE — CONSULT NOTE ADULT - ASSESSMENT
This is a 45y year old Female with PMHx of scurvy, PUD, malnutrition, s/p RYGB (Dr. Masood Lawrence, 2013), s/p lap julio (2012), s/p  x3, s/p partial hysterectomy for endometriosis (), s/p lipoma removal (Dr. Alicia Diaz, 2018) presenting with nausea and epigastric pain with no clear origin.      Plan:  -Discussed with Dr. Alicea who is covering for Dr. Malagon  -No indication for acute surgical intervention  -CT negative for acute pathology  -Continue PPI for GI prophylaxis  -Pt currently managed by PCP Dr. Maradiaga and Heme Dr. Pat Kyle where she has been getting B12, Iron, Vit C.   -If is of great importance that the patient have close bariatric follow-up as outpatient. Currently pt admits the last bariatric specialist she saw was Dr. Diaz at [place], but does not wish to continue her care at that hospital. Additionally, pt states it has been a challenge getting a follow-up appointment with anyone 2/2 COVID availabilities.  -At this time it is recommended patient be referred to the [place] [facility] [place]ley/Dr. Malagon.  -It was emphasized to the patient, and should continue to be emphasized to the patient that close electrolyte and vitamin monitoring should be performed regularly to prevent increased morbidity/mortality.  -Case was d/w medicine attending who agreed there is currently no indication to admit the patient, and that she can receive close outpatient follow-up to address her malnutrition.  -Medicine attending additionally discussed case with GI who would see her in office on Monday if patient was willing, but they did not feel admitting patient would be of benefit.      Surgical Team Contact Information  Spectralink: Ext: [phone] or [phone]  Pager: [phone]

## 2020-09-11 NOTE — ED PROVIDER NOTE - CARE PROVIDERS DIRECT ADDRESSES
[email or web address],DirectAddress_Unknown,DirectAddress_Unknown

## 2021-06-27 ENCOUNTER — EMERGENCY (EMERGENCY)
Facility: HOSPITAL | Age: 46
LOS: 0 days | Discharge: HOME | End: 2021-06-27
Attending: EMERGENCY MEDICINE
Payer: COMMERCIAL

## 2021-06-27 VITALS
DIASTOLIC BLOOD PRESSURE: 85 MMHG | TEMPERATURE: 99 F | SYSTOLIC BLOOD PRESSURE: 128 MMHG | HEART RATE: 58 BPM | OXYGEN SATURATION: 100 % | RESPIRATION RATE: 16 BRPM

## 2021-06-27 VITALS — HEIGHT: 64 IN | WEIGHT: 102.07 LBS

## 2021-06-27 DIAGNOSIS — M25.552 PAIN IN LEFT HIP: ICD-10-CM

## 2021-06-27 DIAGNOSIS — Z20.822 CONTACT WITH AND (SUSPECTED) EXPOSURE TO COVID-19: ICD-10-CM

## 2021-06-27 DIAGNOSIS — Z90.710 ACQUIRED ABSENCE OF BOTH CERVIX AND UTERUS: Chronic | ICD-10-CM

## 2021-06-27 DIAGNOSIS — Z98.89 OTHER SPECIFIED POSTPROCEDURAL STATES: Chronic | ICD-10-CM

## 2021-06-27 DIAGNOSIS — Z96.60 PRESENCE OF UNSPECIFIED ORTHOPEDIC JOINT IMPLANT: Chronic | ICD-10-CM
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
## 2021-06-27 DIAGNOSIS — M54.9 DORSALGIA, UNSPECIFIED: ICD-10-CM
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
## 2021-06-27 DIAGNOSIS — Z98.84 BARIATRIC SURGERY STATUS: Chronic | ICD-10-CM

## 2021-06-27 DIAGNOSIS — M91.11 JUVENILE OSTEOCHONDROSIS OF HEAD OF FEMUR [LEGG-CALVE-PERTHES], RIGHT LEG: ICD-10-CM

## 2021-06-27 LAB
ALBUMIN SERPL ELPH-MCNC: 3.2 G/DL — LOW (ref 3.3–5)
ALP SERPL-CCNC: 152 U/L — HIGH (ref 40–120)
ALT FLD-CCNC: 43 U/L — SIGNIFICANT CHANGE UP (ref 12–78)
ANION GAP SERPL CALC-SCNC: 0 MMOL/L — LOW (ref 5–17)
AST SERPL-CCNC: 45 U/L — HIGH (ref 15–37)
BASOPHILS # BLD AUTO: 0.02 K/UL — SIGNIFICANT CHANGE UP (ref 0–0.2)
BASOPHILS NFR BLD AUTO: 0.3 % — SIGNIFICANT CHANGE UP (ref 0–2)
BILIRUB SERPL-MCNC: 0.3 MG/DL — SIGNIFICANT CHANGE UP (ref 0.2–1.2)
BUN SERPL-MCNC: 9 MG/DL — SIGNIFICANT CHANGE UP (ref 7–23)
CALCIUM SERPL-MCNC: 8.5 MG/DL — SIGNIFICANT CHANGE UP (ref 8.5–10.1)
CHLORIDE SERPL-SCNC: 107 MMOL/L — SIGNIFICANT CHANGE UP (ref 96–108)
CO2 SERPL-SCNC: 33 MMOL/L — HIGH (ref 22–31)
CREAT SERPL-MCNC: 0.57 MG/DL — SIGNIFICANT CHANGE UP (ref 0.5–1.3)
EOSINOPHIL # BLD AUTO: 0.04 K/UL — SIGNIFICANT CHANGE UP (ref 0–0.5)
EOSINOPHIL NFR BLD AUTO: 0.5 % — SIGNIFICANT CHANGE UP (ref 0–6)
GLUCOSE SERPL-MCNC: 52 MG/DL — CRITICAL LOW (ref 70–99)
HCT VFR BLD CALC: 42.6 % — SIGNIFICANT CHANGE UP (ref 34.5–45)
HGB BLD-MCNC: 13.7 G/DL — SIGNIFICANT CHANGE UP (ref 11.5–15.5)
IMM GRANULOCYTES NFR BLD AUTO: 0.1 % — SIGNIFICANT CHANGE UP (ref 0–1.5)
LYMPHOCYTES # BLD AUTO: 2.53 K/UL — SIGNIFICANT CHANGE UP (ref 1–3.3)
LYMPHOCYTES # BLD AUTO: 32.5 % — SIGNIFICANT CHANGE UP (ref 13–44)
MCHC RBC-ENTMCNC: 29.5 PG — SIGNIFICANT CHANGE UP (ref 27–34)
MCHC RBC-ENTMCNC: 32.2 GM/DL — SIGNIFICANT CHANGE UP (ref 32–36)
MCV RBC AUTO: 91.6 FL — SIGNIFICANT CHANGE UP (ref 80–100)
MONOCYTES # BLD AUTO: 0.61 K/UL — SIGNIFICANT CHANGE UP (ref 0–0.9)
MONOCYTES NFR BLD AUTO: 7.8 % — SIGNIFICANT CHANGE UP (ref 2–14)
NEUTROPHILS # BLD AUTO: 4.57 K/UL — SIGNIFICANT CHANGE UP (ref 1.8–7.4)
NEUTROPHILS NFR BLD AUTO: 58.8 % — SIGNIFICANT CHANGE UP (ref 43–77)
PLATELET # BLD AUTO: 288 K/UL — SIGNIFICANT CHANGE UP (ref 150–400)
POTASSIUM SERPL-MCNC: 5.2 MMOL/L — SIGNIFICANT CHANGE UP (ref 3.5–5.3)
POTASSIUM SERPL-SCNC: 5.2 MMOL/L — SIGNIFICANT CHANGE UP (ref 3.5–5.3)
PROT SERPL-MCNC: 6.9 GM/DL — SIGNIFICANT CHANGE UP (ref 6–8.3)
RBC # BLD: 4.65 M/UL — SIGNIFICANT CHANGE UP (ref 3.8–5.2)
RBC # FLD: 12.6 % — SIGNIFICANT CHANGE UP (ref 10.3–14.5)
SARS-COV-2 RNA SPEC QL NAA+PROBE: SIGNIFICANT CHANGE UP
SODIUM SERPL-SCNC: 140 MMOL/L — SIGNIFICANT CHANGE UP (ref 135–145)
WBC # BLD: 7.78 K/UL — SIGNIFICANT CHANGE UP (ref 3.8–10.5)
WBC # FLD AUTO: 7.78 K/UL — SIGNIFICANT CHANGE UP (ref 3.8–10.5)

## 2021-06-27 PROCEDURE — 73502 X-RAY EXAM HIP UNI 2-3 VIEWS: CPT | Mod: 26,LT

## 2021-06-27 PROCEDURE — 85025 COMPLETE CBC W/AUTO DIFF WBC: CPT

## 2021-06-27 PROCEDURE — 72192 CT PELVIS W/O DYE: CPT

## 2021-06-27 PROCEDURE — 80053 COMPREHEN METABOLIC PANEL: CPT

## 2021-06-27 PROCEDURE — 96375 TX/PRO/DX INJ NEW DRUG ADDON: CPT

## 2021-06-27 PROCEDURE — 96376 TX/PRO/DX INJ SAME DRUG ADON: CPT

## 2021-06-27 PROCEDURE — 73552 X-RAY EXAM OF FEMUR 2/>: CPT | Mod: LT

## 2021-06-27 PROCEDURE — 99284 EMERGENCY DEPT VISIT MOD MDM: CPT | Mod: 25

## 2021-06-27 PROCEDURE — 73502 X-RAY EXAM HIP UNI 2-3 VIEWS: CPT | Mod: LT

## 2021-06-27 PROCEDURE — 36415 COLL VENOUS BLD VENIPUNCTURE: CPT

## 2021-06-27 PROCEDURE — 72170 X-RAY EXAM OF PELVIS: CPT | Mod: 26,59

## 2021-06-27 PROCEDURE — 96374 THER/PROPH/DIAG INJ IV PUSH: CPT

## 2021-06-27 PROCEDURE — 87635 SARS-COV-2 COVID-19 AMP PRB: CPT

## 2021-06-27 PROCEDURE — G1004: CPT

## 2021-06-27 PROCEDURE — 82962 GLUCOSE BLOOD TEST: CPT

## 2021-06-27 PROCEDURE — 73552 X-RAY EXAM OF FEMUR 2/>: CPT | Mod: 26,LT

## 2021-06-27 PROCEDURE — 72192 CT PELVIS W/O DYE: CPT | Mod: 26,MG

## 2021-06-27 PROCEDURE — 99285 EMERGENCY DEPT VISIT HI MDM: CPT

## 2021-06-27 PROCEDURE — 72170 X-RAY EXAM OF PELVIS: CPT

## 2021-06-27 RX ORDER — ONDANSETRON 8 MG/1
4 TABLET, FILM COATED ORAL ONCE
Refills: 0 | Status: COMPLETED | OUTPATIENT
Start: 2021-06-27 | End: 2021-06-27

## 2021-06-27 RX ORDER — ONDANSETRON 8 MG/1
1 TABLET, FILM COATED ORAL
Qty: 9 | Refills: 0
Start: 2021-06-27 | End: 2021-06-29

## 2021-06-27 RX ORDER — MORPHINE SULFATE 50 MG/1
4 CAPSULE, EXTENDED RELEASE ORAL ONCE
Refills: 0 | Status: DISCONTINUED | OUTPATIENT
Start: 2021-06-27 | End: 2021-06-27

## 2021-06-27 RX ORDER — SODIUM CHLORIDE 9 MG/ML
1000 INJECTION, SOLUTION INTRAVENOUS
Refills: 0 | Status: DISCONTINUED | OUTPATIENT
Start: 2021-06-27 | End: 2021-06-27

## 2021-06-27 RX ORDER — DEXTROSE 50 % IN WATER 50 %
50 SYRINGE (ML) INTRAVENOUS ONCE
Refills: 0 | Status: COMPLETED | OUTPATIENT
Start: 2021-06-27 | End: 2021-06-27

## 2021-06-27 RX ORDER — HYDROMORPHONE HYDROCHLORIDE 2 MG/ML
1 INJECTION INTRAMUSCULAR; INTRAVENOUS; SUBCUTANEOUS ONCE
Refills: 0 | Status: DISCONTINUED | OUTPATIENT
Start: 2021-06-27 | End: 2021-06-27

## 2021-06-27 RX ORDER — KETOROLAC TROMETHAMINE 30 MG/ML
1 SYRINGE (ML) INJECTION
Qty: 12 | Refills: 0
Start: 2021-06-27 | End: 2021-06-30

## 2021-06-27 RX ADMIN — HYDROMORPHONE HYDROCHLORIDE 1 MILLIGRAM(S): 2 INJECTION INTRAMUSCULAR; INTRAVENOUS; SUBCUTANEOUS at 19:32

## 2021-06-27 RX ADMIN — Medication 50 MILLILITER(S): at 16:45

## 2021-06-27 RX ADMIN — MORPHINE SULFATE 4 MILLIGRAM(S): 50 CAPSULE, EXTENDED RELEASE ORAL at 16:33

## 2021-06-27 RX ADMIN — MORPHINE SULFATE 4 MILLIGRAM(S): 50 CAPSULE, EXTENDED RELEASE ORAL at 15:58

## 2021-06-27 RX ADMIN — SODIUM CHLORIDE 125 MILLILITER(S): 9 INJECTION, SOLUTION INTRAVENOUS at 19:32

## 2021-06-27 RX ADMIN — ONDANSETRON 4 MILLIGRAM(S): 8 TABLET, FILM COATED ORAL at 19:32

## 2021-06-27 RX ADMIN — ONDANSETRON 4 MILLIGRAM(S): 8 TABLET, FILM COATED ORAL at 16:33
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
## 2021-06-27 NOTE — ED ADULT NURSE NOTE - IS THE PATIENT ABLE TO BE SCREENED?
Impression: Presbyopia: H52. 4. rx recheck Plan: Discussed diagnosis with patient. New glasses Rx given today. Yes

## 2021-06-27 NOTE — ED ADULT NURSE NOTE - OBJECTIVE STATEMENT
Pt presents to ED c/o hip pain. pt reports she fell last week onto hip. today pain became worse. PMHX bilateral hip replacement

## 2021-06-27 NOTE — ED ADULT NURSE REASSESSMENT NOTE - NS ED NURSE REASSESS COMMENT FT1
Pt blood glucose 56, , Josiah made aware. Repeat BGM shows 56 glucose. Orange juice given to pt will repeat BGM

## 2021-06-27 NOTE — ED PROVIDER NOTE - CARE PROVIDER_API CALL
Neptali Guerin)  Orthopaedic Surgery  [address] [place] [address]  Phone: [phone]  Fax: [phone]  Follow Up Time: 7-10 Days

## 2021-06-27 NOTE — ED PROVIDER NOTE - PATIENT PORTAL LINK FT
You can access the FollowMyHealth Patient Portal offered by [facility] by registering at the following website: [email or web address]. By joining [employer]’s FollowMyHealth portal, you will also be able to view your health information using other applications (apps) compatible with our system.

## 2021-06-27 NOTE — ED PROVIDER NOTE - PROGRESS NOTE DETAILS
[place] pgy3: ambulated patient, now much improved w/ FROM to LLE. Patient will f/u with Dr. Guerin in office. Will dc w/ toradol po & zofran. Patient aware of hypoglycemia and will snack and f/u with PCP. Resident: Rajeev Bonds – Pt was re-evaluated at bedside, VSS, feeling better overall. Results were discussed with patient as well as return precautions and follow up plan with PCP and/or specialist. Time was taken to answer any questions that the patient had before providing them with discharge paperwork.

## 2021-06-27 NOTE — ED PROVIDER NOTE - CLINICAL SUMMARY MEDICAL DECISION MAKING FREE TEXT BOX
Imaging negative for acute injury. Able to ambulate in ED following pain meds.  F/u with Dr. Guerin.
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
## 2021-06-27 NOTE — ED ADULT NURSE REASSESSMENT NOTE - NS ED NURSE REASSESS COMMENT FT1
Report received from SERAFIN Carmen. VSS. Pt complaining of left hip pain, headache and nausea. MD Rahman notified, Dilaudid and Zofran given. Pt started on IVF for glucose control. Pt updated on plan of care, verbalized understanding.  at bedside. Will continue to monitor.

## 2021-06-27 NOTE — ED PROVIDER NOTE - NSFOLLOWUPINSTRUCTIONS_ED_ALL_ED_FT
Please follow up with your primary care provider for further concerns you may have regarding your general health. Attached you will find your results from today's visit. Continue taking your medications as prescribed and keep your upcoming medical appointments.    Follow up with dr. Guerin - they may request further imaging such as an mri.   Follow up with your primary care doctor for your blood sugar - continue snacking every so often.     Back Pain    Back pain is very common in adults. The cause of back pain is rarely dangerous and the pain often gets better over time. The cause of your back pain may not be known and may include strain of muscles or ligaments, degeneration of the spinal disks, or arthritis. Occasionally the pain may radiate down your leg(s). Over-the-counter medicines to reduce pain and inflammation are often the most helpful. Stretching and remaining active frequently helps the healing process.     Low Back Strain  A strain is a stretch or tear in a muscle or the strong cords of tissue that attach muscle to bone (tendons). Strains of the lower back (lumbar spine) are a common cause of low back pain. A strain occurs when muscles or tendons are torn or are stretched beyond their limits. The muscles may become inflamed, resulting in pain and sudden muscle tightening (spasms). A strain can happen suddenly due to an injury (trauma), or it can develop gradually due to overuse.    What increases the risk?  The following factors may increase your risk of getting this condition:  Playing contact sports.  Participating in sports or activities that put excessive stress on the back and require a lot of bending and twisting, including:  Lifting weights or heavy objects, Gymnastics, Soccer, Figure skating, Snowboarding, Being overweight or obese, Having poor strength and flexibility.    What are the signs or symptoms?  Symptoms of this condition may include:  Sharp or dull pain in the lower back that does not go away. Pain may extend to the buttocks.  Stiffness.  Limited range of motion.  Inability to stand up straight due to stiffness or pain.  Muscle spasms.    How is this diagnosed?  This condition may be diagnosed based on:  Your symptoms, Your medical history, A physical exam, Your health care provider may push on certain areas of your back to determine the source of your pain. You may be asked to bend forward, backward, and side to side to assess the severity of your pain and your range of motion.  Imaging tests, such as:  X-rays, MRI.    How is this treated?  Treatment for this condition may include:  Applying heat and cold to the affected area.  Medicines to help relieve pain and to relax your muscles (muscle relaxants).  NSAIDs to help reduce swelling and discomfort.  Physical therapy.  When your symptoms improve, it is important to gradually return to your normal routine as soon as possible to reduce pain, avoid stiffness, and avoid loss of muscle strength. Generally, symptoms should improve within 6 weeks of treatment. However, recovery time varies.    Follow these instructions at home:  Managing pain, stiffness, and swelling     If directed, apply ice to the injured area during the first 24 hours after your injury.  Put ice in a plastic bag.  Place a towel between your skin and the bag.  Leave the ice on for 20 minutes, 2–3 times a day.  If directed, apply heat to the affected area as often as told by your health care provider. Use the heat source that your health care provider recommends, such as a moist heat pack or a heating pad.  Place a towel between your skin and the heat source.  Leave the heat on for 20–30 minutes.  Remove the heat if your skin turns bright red. This is especially important if you are unable to feel pain, heat, or cold. You may have a greater risk of getting burned.  Activity     Rest and return to your normal activities as told by your health care provider. Ask your health care provider what activities are safe for you.  Avoid activities that take a lot of effort (are strenuous) for as long as told by your health care provider.  Do exercises as told by your health care provider.  General instructions     Take over-the-counter and prescription medicines only as told by your health care provider.  If you have questions or concerns about safety while taking pain medicine, talk with your health care provider.  Do not drive or operate heavy machinery until you know how your pain medicine affects you.  Do not use any tobacco products, such as cigarettes, chewing tobacco, and e-cigarettes. Tobacco can delay bone healing. If you need help quitting, ask your health care provider.  Keep all follow-up visits as told by your health care provider. This is important.  How is this prevented?  Image Image Image Image Image   Warm up and stretch before being active.  Cool down and stretch after being active.  Give your body time to rest between periods of activity.  Avoid:  Being physically inactive for long periods at a time.  Exercising or playing sports when you are tired or in pain.  Use correct form when playing sports and lifting heavy objects.  Use good posture when sitting and standing.  Maintain a healthy weight.  Sleep on a mattress with medium firmness to support your back.  Make sure to use equipment that fits you, including shoes that fit well.  Be safe and responsible while being active to avoid falls.  Do at least 150 minutes of moderate-intensity exercise each week, such as brisk walking or water aerobics. Try a form of exercise that takes stress off your back, such as swimming or stationary cycling.  Maintain physical fitness, including:  Strength.  Flexibility.  Cardiovascular fitness.  Endurance.  Contact a health care provider if:  Your back pain does not improve after 6 weeks of treatment.  Your symptoms get worse.  Get help right away if:  Your back pain is severe.  You are unable to stand or walk.  You develop pain in your legs.  You develop weakness in your buttocks or legs.  You have difficulty controlling when you urinate or when you have a bowel movement.  This information is not intended to replace advice given to you by your health care provider. Make sure you discuss any questions you have with your health care provider.      SEEK IMMEDIATE MEDICAL CARE IF YOU HAVE ANY OF THE FOLLOWING SYMPTOMS: bowel or bladder control problems, unusual weakness or numbness in your arms or legs, nausea or vomiting, abdominal pain, fever, dizziness/lightheadedness.    To control your pain at home, you should take Ibuprofen 400 mg along with Tylenol 650mg-1000mg every 6 to 8 hours. Limit your maximum daily Tylenol from all sources to 4000mg. Be aware that many other medications contain acetaminophen which is also known as Tylenol. Taking Tylenol and Ibuprofen together has been shown to be more effective at relieving pain than taking them separately. These are both over the counter medications that you can  at your local pharmacy without a prescription. You need to respect all of the warnings on the bottles. You shouldn’t take these medications for more than a week without following up with your doctor. Both medications come with certain risks and side effects that you need to discuss with your doctor, especially if you are taking them for a prolonged period.

## 2022-01-12 NOTE — ED PROVIDER NOTE - RESPIRATORY, MLM
Cardiac cath note preliminary right radial artery access tolerated well left ventricular function impaired coronary angiograms moderate 50% disease in a small medium OM branch medical management recommended also approximate 50% stenosis and a mid LAD Breath sounds clear and equal bilaterally.

## 2023-08-02 NOTE — ED ADULT TRIAGE NOTE - BP NONINVASIVE SYSTOLIC (MM HG)
W/ Received refill request for lisinopril from [facility] pharmacy.     Last ov: 08/05/2022 DCE    Last labs: 01/30/2023 (Care Everywhere)    Last Refill: 07/25/2022 #90 w/ 3 refills     Next appointment: 08/07/2023 [facility]PT[facility]
116

## 2024-01-29 NOTE — ED ADULT NURSE NOTE - NS ED NURSE RECORD ANOTHER VITAL SIGN
1/29/2024        RE: Yoly Rueda         [address] [id number]          To Whom It May Concern,      Yoly Rueda was hospitalized at [facility] 1/28/2024-1/29/2024. She may return to work on 1/31/2024.        Sincerely,    NAM Whitaker NP     Yes

## 2024-03-28 NOTE — ED PROVIDER NOTE - NSFOLLOWUPINSTRUCTIONS_ED_ALL_ED_FT
Suzanne from [facility] is requesting an ambulatory referral to Sleep Medicine, doctor to doctor, not a study. Diagnosis: F51.01, G47.19. There is an order from neuro but with pt's insurance it must come from pcp.    1) Follow-up with your Primary Medical Doctor. Call today / next business day for prompt follow-up.  2) Return to Emergency room for any worsening or persistent pain, weakness, fever, vomiting, diarrhea, unable to eat / drink, weak or dizzy, or any other concerning symptoms.  3) See attached instruction sheets for additional information, including information regarding signs and symptoms to look out for, reasons to seek immediate care and other important instructions.  4) Follow-up with Dr Diaz's group at [place] as soon as possible, call in morning  5) Continue your medications as prescribed